# Patient Record
Sex: FEMALE | Race: WHITE | NOT HISPANIC OR LATINO | Employment: UNEMPLOYED | URBAN - METROPOLITAN AREA
[De-identification: names, ages, dates, MRNs, and addresses within clinical notes are randomized per-mention and may not be internally consistent; named-entity substitution may affect disease eponyms.]

---

## 2020-11-17 ENCOUNTER — APPOINTMENT (OUTPATIENT)
Dept: GENERAL RADIOLOGY | Facility: HOSPITAL | Age: 50
End: 2020-11-17

## 2020-11-17 ENCOUNTER — APPOINTMENT (OUTPATIENT)
Dept: CT IMAGING | Facility: HOSPITAL | Age: 50
End: 2020-11-17

## 2020-11-17 ENCOUNTER — HOSPITAL ENCOUNTER (EMERGENCY)
Facility: HOSPITAL | Age: 50
Discharge: HOME | End: 2020-11-17
Attending: EMERGENCY MEDICINE | Admitting: EMERGENCY MEDICINE

## 2020-11-17 VITALS
SYSTOLIC BLOOD PRESSURE: 152 MMHG | TEMPERATURE: 98.5 F | DIASTOLIC BLOOD PRESSURE: 92 MMHG | RESPIRATION RATE: 16 BRPM | OXYGEN SATURATION: 97 % | HEIGHT: 64 IN | WEIGHT: 185 LBS | HEART RATE: 96 BPM | BODY MASS INDEX: 31.58 KG/M2

## 2020-11-17 DIAGNOSIS — F10.920 ALCOHOLIC INTOXICATION WITHOUT COMPLICATION (HCC): ICD-10-CM

## 2020-11-17 DIAGNOSIS — F10.20 ALCOHOLISM (HCC): ICD-10-CM

## 2020-11-17 DIAGNOSIS — R10.9 ABDOMINAL PAIN, UNSPECIFIED ABDOMINAL LOCATION: Primary | ICD-10-CM

## 2020-11-17 DIAGNOSIS — R11.2 NON-INTRACTABLE VOMITING WITH NAUSEA, UNSPECIFIED VOMITING TYPE: ICD-10-CM

## 2020-11-17 LAB
ALBUMIN SERPL-MCNC: 4.3 G/DL (ref 3.5–5.2)
ALBUMIN/GLOB SERPL: 1.5 G/DL
ALP SERPL-CCNC: 86 U/L (ref 39–117)
ALT SERPL W P-5'-P-CCNC: 61 U/L (ref 1–33)
AMPHET+METHAMPHET UR QL: NEGATIVE
ANION GAP SERPL CALCULATED.3IONS-SCNC: 14.2 MMOL/L (ref 5–15)
AST SERPL-CCNC: 210 U/L (ref 1–32)
BACTERIA UR QL AUTO: ABNORMAL /HPF
BARBITURATES UR QL SCN: NEGATIVE
BASOPHILS # BLD AUTO: 0.04 10*3/MM3 (ref 0–0.2)
BASOPHILS NFR BLD AUTO: 1.3 % (ref 0–1.5)
BENZODIAZ UR QL SCN: NEGATIVE
BILIRUB SERPL-MCNC: 0.9 MG/DL (ref 0–1.2)
BILIRUB UR QL STRIP: NEGATIVE
BUN SERPL-MCNC: 7 MG/DL (ref 6–20)
BUN/CREAT SERPL: 12.5 (ref 7–25)
CALCIUM SPEC-SCNC: 9 MG/DL (ref 8.6–10.5)
CANNABINOIDS SERPL QL: NEGATIVE
CHLORIDE SERPL-SCNC: 101 MMOL/L (ref 98–107)
CLARITY UR: CLEAR
CO2 SERPL-SCNC: 23.8 MMOL/L (ref 22–29)
COCAINE UR QL: NEGATIVE
COLOR UR: YELLOW
CREAT SERPL-MCNC: 0.56 MG/DL (ref 0.57–1)
DEPRECATED RDW RBC AUTO: 51.2 FL (ref 37–54)
EOSINOPHIL # BLD AUTO: 0.14 10*3/MM3 (ref 0–0.4)
EOSINOPHIL NFR BLD AUTO: 4.6 % (ref 0.3–6.2)
ERYTHROCYTE [DISTWIDTH] IN BLOOD BY AUTOMATED COUNT: 14.2 % (ref 12.3–15.4)
ETHANOL BLD-MCNC: 275 MG/DL (ref 0–10)
ETHANOL UR QL: 0.28 %
GFR SERPL CREATININE-BSD FRML MDRD: 115 ML/MIN/1.73
GLOBULIN UR ELPH-MCNC: 2.8 GM/DL
GLUCOSE SERPL-MCNC: 97 MG/DL (ref 65–99)
GLUCOSE UR STRIP-MCNC: NEGATIVE MG/DL
HCT VFR BLD AUTO: 40.5 % (ref 34–46.6)
HGB BLD-MCNC: 14.1 G/DL (ref 12–15.9)
HGB UR QL STRIP.AUTO: ABNORMAL
HOLD SPECIMEN: NORMAL
HOLD SPECIMEN: NORMAL
HYALINE CASTS UR QL AUTO: ABNORMAL /LPF
IMM GRANULOCYTES # BLD AUTO: 0 10*3/MM3 (ref 0–0.05)
IMM GRANULOCYTES NFR BLD AUTO: 0 % (ref 0–0.5)
INR PPP: 0.97 (ref 0.9–1.1)
KETONES UR QL STRIP: NEGATIVE
LEUKOCYTE ESTERASE UR QL STRIP.AUTO: NEGATIVE
LYMPHOCYTES # BLD AUTO: 1.53 10*3/MM3 (ref 0.7–3.1)
LYMPHOCYTES NFR BLD AUTO: 49.8 % (ref 19.6–45.3)
MAGNESIUM SERPL-MCNC: 1.9 MG/DL (ref 1.6–2.6)
MCH RBC QN AUTO: 34.4 PG (ref 26.6–33)
MCHC RBC AUTO-ENTMCNC: 34.8 G/DL (ref 31.5–35.7)
MCV RBC AUTO: 98.8 FL (ref 79–97)
METHADONE UR QL SCN: NEGATIVE
MONOCYTES # BLD AUTO: 0.3 10*3/MM3 (ref 0.1–0.9)
MONOCYTES NFR BLD AUTO: 9.8 % (ref 5–12)
NEUTROPHILS NFR BLD AUTO: 1.06 10*3/MM3 (ref 1.7–7)
NEUTROPHILS NFR BLD AUTO: 34.5 % (ref 42.7–76)
NITRITE UR QL STRIP: NEGATIVE
NRBC BLD AUTO-RTO: 0 /100 WBC (ref 0–0.2)
OPIATES UR QL: NEGATIVE
OXYCODONE UR QL SCN: POSITIVE
PH UR STRIP.AUTO: 6.5 [PH] (ref 5–8)
PLATELET # BLD AUTO: 174 10*3/MM3 (ref 140–450)
PMV BLD AUTO: 10 FL (ref 6–12)
POTASSIUM SERPL-SCNC: 3.6 MMOL/L (ref 3.5–5.2)
PROT SERPL-MCNC: 7.1 G/DL (ref 6–8.5)
PROT UR QL STRIP: NEGATIVE
PROTHROMBIN TIME: 12.6 SECONDS (ref 11.7–14.2)
RBC # BLD AUTO: 4.1 10*6/MM3 (ref 3.77–5.28)
RBC # UR: ABNORMAL /HPF
REF LAB TEST METHOD: ABNORMAL
SODIUM SERPL-SCNC: 139 MMOL/L (ref 136–145)
SP GR UR STRIP: >=1.03 (ref 1–1.03)
SQUAMOUS #/AREA URNS HPF: ABNORMAL /HPF
UROBILINOGEN UR QL STRIP: ABNORMAL
WBC # BLD AUTO: 3.07 10*3/MM3 (ref 3.4–10.8)
WBC UR QL AUTO: ABNORMAL /HPF
WHOLE BLOOD HOLD SPECIMEN: NORMAL
WHOLE BLOOD HOLD SPECIMEN: NORMAL

## 2020-11-17 PROCEDURE — 25010000002 IOPAMIDOL 61 % SOLUTION: Performed by: EMERGENCY MEDICINE

## 2020-11-17 PROCEDURE — 85025 COMPLETE CBC W/AUTO DIFF WBC: CPT | Performed by: EMERGENCY MEDICINE

## 2020-11-17 PROCEDURE — 99284 EMERGENCY DEPT VISIT MOD MDM: CPT

## 2020-11-17 PROCEDURE — 99285 EMERGENCY DEPT VISIT HI MDM: CPT

## 2020-11-17 PROCEDURE — 96365 THER/PROPH/DIAG IV INF INIT: CPT

## 2020-11-17 PROCEDURE — 80307 DRUG TEST PRSMV CHEM ANLYZR: CPT | Performed by: EMERGENCY MEDICINE

## 2020-11-17 PROCEDURE — 80053 COMPREHEN METABOLIC PANEL: CPT | Performed by: EMERGENCY MEDICINE

## 2020-11-17 PROCEDURE — 81001 URINALYSIS AUTO W/SCOPE: CPT | Performed by: EMERGENCY MEDICINE

## 2020-11-17 PROCEDURE — 85610 PROTHROMBIN TIME: CPT | Performed by: EMERGENCY MEDICINE

## 2020-11-17 PROCEDURE — 25010000002 MAGNESIUM SULFATE PER 500 MG OF MAGNESIUM: Performed by: EMERGENCY MEDICINE

## 2020-11-17 PROCEDURE — 74177 CT ABD & PELVIS W/CONTRAST: CPT

## 2020-11-17 PROCEDURE — 25010000002 THIAMINE PER 100 MG: Performed by: EMERGENCY MEDICINE

## 2020-11-17 PROCEDURE — 71045 X-RAY EXAM CHEST 1 VIEW: CPT

## 2020-11-17 PROCEDURE — 83735 ASSAY OF MAGNESIUM: CPT | Performed by: EMERGENCY MEDICINE

## 2020-11-17 RX ORDER — SODIUM CHLORIDE 0.9 % (FLUSH) 0.9 %
10 SYRINGE (ML) INJECTION AS NEEDED
Status: DISCONTINUED | OUTPATIENT
Start: 2020-11-17 | End: 2020-11-17 | Stop reason: HOSPADM

## 2020-11-17 RX ORDER — VENLAFAXINE HYDROCHLORIDE 150 MG/1
150 CAPSULE, EXTENDED RELEASE ORAL DAILY
COMMUNITY

## 2020-11-17 RX ORDER — OXYCODONE HYDROCHLORIDE AND ACETAMINOPHEN 5; 325 MG/1; MG/1
1 TABLET ORAL EVERY 6 HOURS PRN
COMMUNITY
End: 2022-01-28

## 2020-11-17 RX ORDER — PROMETHAZINE HYDROCHLORIDE 25 MG/1
25 TABLET ORAL EVERY 6 HOURS PRN
COMMUNITY

## 2020-11-17 RX ADMIN — MAGNESIUM SULFATE HEPTAHYDRATE 1000 ML/HR: 500 INJECTION, SOLUTION INTRAMUSCULAR; INTRAVENOUS at 14:10

## 2020-11-17 RX ADMIN — SODIUM CHLORIDE, PRESERVATIVE FREE 10 ML: 5 INJECTION INTRAVENOUS at 12:40

## 2020-11-17 RX ADMIN — IOPAMIDOL 85 ML: 612 INJECTION, SOLUTION INTRAVENOUS at 14:59

## 2020-11-17 NOTE — ED NOTES
Patient was wearing facemask when RN entered the room and throughout our encounter. RN wearing PPE throughout all patient encounter including a face mask, goggles and gloves.      Diane Elaine RN  11/17/20 1527

## 2020-11-17 NOTE — DISCHARGE INSTRUCTIONS
As we discussed, make sure you follow-up with the treatment centers for further assistance to help stop your addiction to alcoholism.  [facility] is always an option.  Return if any concerns.

## 2020-11-17 NOTE — ED NOTES
"Pt to ED from home with c/o generalized weakness for 1 week, states she has hx of chronic etoh use. Drinks 600ml analilia calderón daily, states has been binge drinking x months. Went through detox at [facility] in January. States having flank pain, dark urine, states feeling very fatigued, also reports loose stools and nausea present, pt does states she has had chronic nausea since neck surgery. On phenergan at home but states doesn't help much, \"spitting up at home with no actually emesis, + upper back tightness x 1 week but denies cough . Pt states she is ambulatory at home. Denies fevers, cough. Denies CP     States feels too dehydrated to give UA but did urinate at home this am. Denies hx of depression. + hx of anxiety.     Patient was placed in face mask in first look. Patient was wearing facemask when I entered the room and throughout our encounter. I wore full protective equipment throughout this patient encounter including a face mask, eye shield, and gloves. Hand hygiene was performed before donning protective equipment and after removal when leaving the room.       Cheri Munoz, RN  11/17/20 2742       Cheri Munoz, RN  11/17/20 7545    "

## 2020-11-17 NOTE — ED PROVIDER NOTES
EMERGENCY DEPARTMENT ENCOUNTER    Room Number:  18/18  Date of encounter:  11/17/2020  PCP: Cierra Sheikh APRN  Historian: Patient      HPI:  Chief Complaint: Abdominal pain and flank pain with vomiting and alcoholism and wants help to stop drinking.  A complete HPI/ROS/PMH/PSH/SH/FH are unobtainable due to: Not applicable  Context: Radha Cao is a 50 y.o. female who presents to the ED c/o alcohol problem and wants to stop drinking.  For the past several months she has had increased alcohol consumption.  She is drinking approximately 1-1/2 pints of hard alcohol a day.  Her last drink was this morning.  She states that she needs help to stop drinking.  She denies any suicidal or homicidal ideation.  She is developed vomiting the past 1 to 2 weeks.  She will vomit a couple times a day.  She has decreased p.o. intake.  She says the vomit is usually yellow and bile.  She reports no bloody emesis.  She might have some loose stool at time but no definitive diarrhea.  Complains of some pain in the midepigastric area and periumbilical area and also in her flanks.  She believes that she is dehydrated.  She reports no burning with urination or frequent urination.  She reports no coughs or colds.  She reports no suicidal ideation.  She reports no illegal drug use.        Previous Episodes: Yes  Current Symptoms: See above    MEDICAL HISTORY REVIEWED        PAST MEDICAL HISTORY  Active Ambulatory Problems     Diagnosis Date Noted   • No Active Ambulatory Problems     Resolved Ambulatory Problems     Diagnosis Date Noted   • No Resolved Ambulatory Problems     Past Medical History:   Diagnosis Date   • H/O ETOH abuse          PAST SURGICAL HISTORY  Past Surgical History:   Procedure Laterality Date   • HYSTERECTOMY           FAMILY HISTORY  History reviewed. No pertinent family history.      SOCIAL HISTORY  Social History     Socioeconomic History   • Marital status:      Spouse name: Not on file   • Number of  children: Not on file   • Years of education: Not on file   • Highest education level: Not on file   Tobacco Use   • Smoking status: Never Smoker   Substance and Sexual Activity   • Alcohol use: Yes   • Drug use: Defer         ALLERGIES  Patient has no known allergies.        REVIEW OF SYSTEMS  Review of Systems     All systems reviewed and negative except for those discussed in HPI.       PHYSICAL EXAM    I have reviewed the triage vital signs and nursing notes.    ED Triage Vitals [11/17/20 1212]   Temp Heart Rate Resp BP SpO2   98.5 °F (36.9 °C) 78 16 (!) 182/76 97 %      Temp src Heart Rate Source Patient Position BP Location FiO2 (%)   Tympanic Monitor -- -- --       GENERAL: Female no acute distress.Vital signs on my initial evaluation unremarkable  HENT: nares patent  Head/neck/ face are symmetric without gross deformity, signs of trauma, or swelling  EYES: no scleral icterus, no conjunctival pallor.  NECK: Supple, no meningismus  CV: regular rhythm, regular rate with intact distal pulses.  No murmur rub  RESPIRATORY: normal effort and no respiratory distress.  Clear to auscultation bilaterally  ABDOMEN: soft and morbidly obese.  Has some periumbilical and midepigastric discomfort with palpation.  Also has some palpation flanks bilaterally.  There is no guarding or rebound.  Normal inspection.  MUSCULOSKELETAL: no deformity.  Intact distal pulses to upper and lower extremities.  They are equal and symmetric.  No cyanosis or pallor.  NEURO: alert and appropriate, moves all extremities, follows commands.  Alert and oriented x3.  No focal motor or sensory changes.  No nystagmus or visual change.  SKIN: warm, dry    Vital signs and nursing notes reviewed.        LAB RESULTS  Recent Results (from the past 24 hour(s))   Comprehensive Metabolic Panel    Collection Time: 11/17/20 12:40 PM    Specimen: Blood   Result Value Ref Range    Glucose 97 65 - 99 mg/dL    BUN 7 6 - 20 mg/dL    Creatinine 0.56 (L) 0.57 - 1.00  mg/dL    Sodium 139 136 - 145 mmol/L    Potassium 3.6 3.5 - 5.2 mmol/L    Chloride 101 98 - 107 mmol/L    CO2 23.8 22.0 - 29.0 mmol/L    Calcium 9.0 8.6 - 10.5 mg/dL    Total Protein 7.1 6.0 - 8.5 g/dL    Albumin 4.30 3.50 - 5.20 g/dL    ALT (SGPT) 61 (H) 1 - 33 U/L    AST (SGOT) 210 (H) 1 - 32 U/L    Alkaline Phosphatase 86 39 - 117 U/L    Total Bilirubin 0.9 0.0 - 1.2 mg/dL    eGFR Non African Amer 115 >60 mL/min/1.73    Globulin 2.8 gm/dL    A/G Ratio 1.5 g/dL    BUN/Creatinine Ratio 12.5 7.0 - 25.0    Anion Gap 14.2 5.0 - 15.0 mmol/L   Magnesium    Collection Time: 11/17/20 12:40 PM    Specimen: Blood   Result Value Ref Range    Magnesium 1.9 1.6 - 2.6 mg/dL   Ethanol    Collection Time: 11/17/20 12:40 PM    Specimen: Blood   Result Value Ref Range    Ethanol 275 (H) 0 - 10 mg/dL    Ethanol % 0.275 %   Protime-INR    Collection Time: 11/17/20 12:40 PM    Specimen: Blood   Result Value Ref Range    Protime 12.6 11.7 - 14.2 Seconds    INR 0.97 0.90 - 1.10   CBC Auto Differential    Collection Time: 11/17/20 12:40 PM    Specimen: Blood   Result Value Ref Range    WBC 3.07 (L) 3.40 - 10.80 10*3/mm3    RBC 4.10 3.77 - 5.28 10*6/mm3    Hemoglobin 14.1 12.0 - 15.9 g/dL    Hematocrit 40.5 34.0 - 46.6 %    MCV 98.8 (H) 79.0 - 97.0 fL    MCH 34.4 (H) 26.6 - 33.0 pg    MCHC 34.8 31.5 - 35.7 g/dL    RDW 14.2 12.3 - 15.4 %    RDW-SD 51.2 37.0 - 54.0 fl    MPV 10.0 6.0 - 12.0 fL    Platelets 174 140 - 450 10*3/mm3    Neutrophil % 34.5 (L) 42.7 - 76.0 %    Lymphocyte % 49.8 (H) 19.6 - 45.3 %    Monocyte % 9.8 5.0 - 12.0 %    Eosinophil % 4.6 0.3 - 6.2 %    Basophil % 1.3 0.0 - 1.5 %    Immature Grans % 0.0 0.0 - 0.5 %    Neutrophils, Absolute 1.06 (L) 1.70 - 7.00 10*3/mm3    Lymphocytes, Absolute 1.53 0.70 - 3.10 10*3/mm3    Monocytes, Absolute 0.30 0.10 - 0.90 10*3/mm3    Eosinophils, Absolute 0.14 0.00 - 0.40 10*3/mm3    Basophils, Absolute 0.04 0.00 - 0.20 10*3/mm3    Immature Grans, Absolute 0.00 0.00 - 0.05 10*3/mm3     nRBC 0.0 0.0 - 0.2 /100 WBC   Light Blue Top    Collection Time: 11/17/20 12:40 PM   Result Value Ref Range    Extra Tube hold for add-on    Green Top (Gel)    Collection Time: 11/17/20 12:40 PM   Result Value Ref Range    Extra Tube Hold for add-ons.    Lavender Top    Collection Time: 11/17/20 12:40 PM   Result Value Ref Range    Extra Tube hold for add-on    Gold Top - SST    Collection Time: 11/17/20 12:40 PM   Result Value Ref Range    Extra Tube Hold for add-ons.    Urinalysis With Microscopic If Indicated (No Culture) - Urine, Clean Catch    Collection Time: 11/17/20  3:39 PM    Specimen: Urine, Clean Catch   Result Value Ref Range    Color, UA Yellow Yellow, Straw    Appearance, UA Clear Clear    pH, UA 6.5 5.0 - 8.0    Specific Gravity, UA >=1.030 1.005 - 1.030    Glucose, UA Negative Negative    Ketones, UA Negative Negative    Bilirubin, UA Negative Negative    Blood, UA Trace (A) Negative    Protein, UA Negative Negative    Leuk Esterase, UA Negative Negative    Nitrite, UA Negative Negative    Urobilinogen, UA 1.0 E.U./dL 0.2 - 1.0 E.U./dL   Urinalysis, Microscopic Only - Urine, Clean Catch    Collection Time: 11/17/20  3:39 PM    Specimen: Urine, Clean Catch   Result Value Ref Range    RBC, UA 0-2 None Seen, 0-2 /HPF    WBC, UA 0-2 None Seen, 0-2 /HPF    Bacteria, UA 1+ (A) None Seen /HPF    Squamous Epithelial Cells, UA 0-2 None Seen, 0-2 /HPF    Hyaline Casts, UA 0-2 None Seen /LPF    Methodology Manual Light Microscopy        Ordered the above labs and independently reviewed the results.        RADIOLOGY  Ct Abdomen Pelvis With Contrast    Result Date: 11/17/2020  CT ABDOMEN AND PELVIS WITH CONTRAST  HISTORY: Abdominal pain, flank pain. Vomiting.  TECHNIQUE: Axial CT images of the abdomen and pelvis were obtained following administration of intravenous contrast. The patient was not given oral contrast Coronal and sagittal reformats were obtained.  COMPARISON: None  FINDINGS: There is diffuse  low-attenuation of the liver consistent with steatosis. Focal low density seen within the central left hepatic lobe on images 30 through 41 is favored to represent marked focal steatosis. No intrahepatic biliary dilatation is seen. The gallbladder is distended. The spleen is normal in size and attenuation. The pancreas is normal without ductal dilatation. Bilateral adrenal glands are normal. Both kidneys are normal in size and attenuation. No renal calculi or hydronephrosis. The urinary bladder is partially distended with mild wall thickening. The uterus is not identified and is likely surgically absent. The small and large bowel loops demonstrate normal caliber. There is no pathological retroperitoneal lymphadenopathy.      1. Diffuse hepatic steatosis. In addition there is focal hypoattenuating central left hepatic lobe abnormality favored to represent focal marked steatotic change. 2. Mild wall thickening of the urinary bladder likely related to underdistention, however, correlation with urine exam is recommended to rule out cystitis.  These findings were discussed with Dr. Dacosta by telephone.  Radiation dose reduction techniques were utilized, including automated exposure control and exposure modulation based on body size.       Xr Chest 1 View    Result Date: 11/17/2020  XR CHEST 1 VW-  HISTORY: Female who is 50 years-old,  cough  TECHNIQUE: Frontal view of the chest  COMPARISON: None available  FINDINGS: Heart, mediastinum and pulmonary vasculature are unremarkable. No focal pulmonary consolidation, pleural effusion, or pneumothorax. No acute osseous process.      No evidence for acute pulmonary process. Follow-up as clinical indications persist.  This report was finalized on 11/17/2020 1:51 PM by Dr. Ed Dixon M.D.        I ordered the above noted radiological studies. Reviewed by me and discussed with radiologist.  See dictation for official radiology  interpretation.      PROCEDURES    Procedures      MEDICATIONS GIVEN IN ER    Medications   sodium chloride 0.9 % flush 10 mL (10 mL Intravenous Given 11/17/20 1240)   multiple vitamin (M.V.I. Adult) 10 mL, thiamine (B-1) 100 mg, folic acid 1 mg, magnesium sulfate 2 g in sodium chloride 0.9 % 1,000 mL infusion (0 mL/hr Intravenous Stopped 11/17/20 1538)   iopamidol (ISOVUE-300) 61 % injection 100 mL (85 mL Intravenous Given by Other 11/17/20 1459)         PROGRESS, DATA ANALYSIS, CONSULTS, AND MEDICAL DECISION MAKING    Informed patient of the test that we will order.  Patient really feels that she is dehydrated she says that she is not drinking much.  She is able to consume alcohol.  She is also has not vomiting.  She has not eaten much but she did have some soup yesterday.  All questions answered at this time.  We are currently under a pandemic from the COVID19 infection.  The patient presented to the emergency department by ambulance or personal vehicle. I followed the current protocols required by Infection Control at  in my evaluation and treatment of the patient. The patient was wearing a face mask during my evaluation and throughout my encounter. During my whole encounter with this patient I used appropriate personal protective equipment.  This equipment consisted of eye protection, facemask, gown, and gloves.  I applied this equipment before entering the room.      All labs have been independently reviewed by me.  All radiology studies have been reviewed by me and discussed with radiologist dictating the report.   EKG's independently viewed and interpreted by me.  Discussion below represents my analysis of pertinent findings related to patient's condition, differential diagnosis, treatment plan and final disposition.      ED Course as of Nov 17 1629   Tue Nov 17, 2020   1328 Ethanol(!): 275 [MM]   1419 ALT (SGPT)(!): 61 [MM]   1419 Patient is an alcoholic.  Patient has mild elevation  liver function test very likely consistent with alcoholic hepatitis.   AST (SGOT)(!): 210 [MM]   1442 I reevaluated the patient.  She is very stable.  No distress.  Informed her the results of all of her lab work.  I also spoke with her spouse as well informed both of them Deep BATEMAN has some alcoholic hepatitis.  We can do a CT scan of her abdomen pelvis.  She still has not been able to give us a urine.  We actually have the banana bag fluids going at this time.  All questions answered at this time.    [MM]   1532 Discussed with Dr. Hardy the radiologist concerning the CT scan of the abdomen pelvis.  Patient has some hepatic steatosis.  Otherwise no acute process.    [MM]   1625 I have reevaluated the patient again.  Patient is hemodynamically stable in no distress.  Has not had any vomiting during her time in the emergency department.  I instructed her that she needs to stop drinking alcohol.  She has plans to stop that.  She is gone through rehab before and she has contacts that she is going to try to get in touch with for alcohol treatment.  She is not suicidal or homicidal she does not want evaluation by access her therapy here.  I informed her the results of the lab work and the CT scan.  All questions answered.  Patient spouse was in the room present during this conversation.    [MM]      ED Course User Index  [MM] Franc Dacosta MD       AS OF 16:29 EST VITALS:    BP - 152/92  HR - 96  TEMP - 98.5 °F (36.9 °C) (Tympanic)  02 SATS - 97%        DIAGNOSIS  Final diagnoses:   Abdominal pain, unspecified abdominal location   Non-intractable vomiting with nausea, unspecified vomiting type   Alcoholic intoxication without complication (CMS/HCC)   Alcoholism (CMS/HCC)         DISPOSITION  DISCHARGE    Patient discharged in stable condition.    Reviewed implications of results, diagnosis, meds, responsibility to follow up, warning signs and symptoms of possible worsening, potential complications and reasons to  return to ER, including worsening of symptoms, not tolerating liquids, any thoughts of harming herself, worsening of depression.  Not tolerate liquids, fever, chest pain, shortness of breath, any concerns..    Patient/Family voiced understanding of above instructions.    Discussed plan for discharge, as there is no emergent indication for admission. Pt/family is agreeable and understands need for follow up and repeat testing.  Pt is aware that discharge does not mean that nothing is wrong but it indicates no emergency is present that requires admission and they must continue care with follow-up as given below or physician of their choice.     FOLLOW-UP  Cierra Sheikh, APRN  [address]  [facility] [address] [phone]    In 3 days  Return if not tolerating any liquids, worsening of symptoms, fever, vomiting up blood, any blood in stool, chest pain, any concerns.         Medication List      No changes were made to your prescriptions during this visit.                  Franc Dacosta MD  11/17/20 6341

## 2020-11-17 NOTE — ED TRIAGE NOTES
Pt has been drinking x 1 week and reports she is weak.  She wants help c withdrawal  She drinks 650 ml of alcohol per day    Patient was placed in face mask during first look triage.  Patient was wearing a face mask throughout encounter.  I wore personal protective equipment throughout the encounter.  Hand hygiene was performed before and after patient encounter.

## 2020-11-17 NOTE — ED NOTES
" now at bedside, states they are very frustrated because she was getting Rx's for ativan and phenergan from PCP and was doing well x months and was sober, PCP will no longer prescribe those meds for her.  states \"she can't not drink\", does have a liver MD, has not seen since prior to neck surgery early this yr. Not currently seeing psychiatrist. States cannot seem to get any help d/t covid. Pt also has been taking her daughter's phenergan.      Cheri Munoz, RN  11/17/20 7610    "

## 2022-01-28 ENCOUNTER — OFFICE VISIT (OUTPATIENT)
Dept: FAMILY MEDICINE CLINIC | Facility: CLINIC | Age: 52
End: 2022-01-28

## 2022-01-28 VITALS
TEMPERATURE: 97.5 F | DIASTOLIC BLOOD PRESSURE: 62 MMHG | HEIGHT: 64 IN | OXYGEN SATURATION: 98 % | RESPIRATION RATE: 16 BRPM | HEART RATE: 69 BPM | WEIGHT: 213 LBS | SYSTOLIC BLOOD PRESSURE: 110 MMHG | BODY MASS INDEX: 36.37 KG/M2

## 2022-01-28 DIAGNOSIS — H65.02 NON-RECURRENT ACUTE SEROUS OTITIS MEDIA OF LEFT EAR: ICD-10-CM

## 2022-01-28 DIAGNOSIS — K74.60 HEPATIC CIRRHOSIS, UNSPECIFIED HEPATIC CIRRHOSIS TYPE, UNSPECIFIED WHETHER ASCITES PRESENT: ICD-10-CM

## 2022-01-28 DIAGNOSIS — M50.30 DDD (DEGENERATIVE DISC DISEASE), CERVICAL: ICD-10-CM

## 2022-01-28 DIAGNOSIS — R10.11 RIGHT UPPER QUADRANT PAIN: Primary | ICD-10-CM

## 2022-01-28 DIAGNOSIS — Z98.890: ICD-10-CM

## 2022-01-28 DIAGNOSIS — F10.10 ETOH ABUSE: ICD-10-CM

## 2022-01-28 DIAGNOSIS — R93.5 ABNORMAL US (ULTRASOUND) OF ABDOMEN: ICD-10-CM

## 2022-01-28 DIAGNOSIS — Z94.4: ICD-10-CM

## 2022-01-28 PROBLEM — R50.9 FEVER: Status: RESOLVED | Noted: 2020-12-28 | Resolved: 2022-01-28

## 2022-01-28 PROBLEM — R74.01 ELEVATED AST (SGOT): Status: ACTIVE | Noted: 2018-01-15

## 2022-01-28 PROBLEM — E66.9 OBESITY: Status: ACTIVE | Noted: 2022-01-28

## 2022-01-28 PROBLEM — U07.1 PNEUMONIA DUE TO COVID-19 VIRUS: Status: RESOLVED | Noted: 2020-12-28 | Resolved: 2022-01-28

## 2022-01-28 PROBLEM — M48.00 SPINAL STENOSIS: Status: ACTIVE | Noted: 2020-04-29

## 2022-01-28 PROBLEM — R22.1 NECK SWELLING: Status: ACTIVE | Noted: 2020-05-03

## 2022-01-28 PROBLEM — J12.82 PNEUMONIA DUE TO COVID-19 VIRUS: Status: ACTIVE | Noted: 2020-12-28

## 2022-01-28 PROBLEM — J12.82 PNEUMONIA DUE TO COVID-19 VIRUS: Status: RESOLVED | Noted: 2020-12-28 | Resolved: 2022-01-28

## 2022-01-28 PROBLEM — G62.9 NEUROPATHY: Status: ACTIVE | Noted: 2022-01-28

## 2022-01-28 PROBLEM — E51.9 THIAMINE DEFICIENCY, UNSPECIFIED: Status: ACTIVE | Noted: 2020-01-21

## 2022-01-28 PROBLEM — M54.6 THORACIC BACK PAIN: Status: ACTIVE | Noted: 2018-09-21

## 2022-01-28 PROBLEM — U07.1 PNEUMONIA DUE TO COVID-19 VIRUS: Status: ACTIVE | Noted: 2020-12-28

## 2022-01-28 PROBLEM — F10.20 ALCOHOL USE DISORDER, SEVERE, DEPENDENCE: Status: ACTIVE | Noted: 2019-12-16

## 2022-01-28 PROBLEM — R06.83 SNORING: Status: ACTIVE | Noted: 2022-01-28

## 2022-01-28 PROBLEM — F10.939 ALCOHOL WITHDRAWAL SYNDROME: Status: ACTIVE | Noted: 2019-02-04

## 2022-01-28 PROBLEM — R50.9 FEVER: Status: ACTIVE | Noted: 2020-12-28

## 2022-01-28 PROCEDURE — 99204 OFFICE O/P NEW MOD 45 MIN: CPT | Performed by: NURSE PRACTITIONER

## 2022-01-28 RX ORDER — BROMPHENIRAMINE MALEATE, PSEUDOEPHEDRINE HYDROCHLORIDE, AND DEXTROMETHORPHAN HYDROBROMIDE 2; 30; 10 MG/5ML; MG/5ML; MG/5ML
SYRUP ORAL
COMMUNITY
Start: 2021-11-15 | End: 2022-03-10

## 2022-01-28 RX ORDER — CYCLOBENZAPRINE HCL 10 MG
TABLET ORAL
COMMUNITY
End: 2022-06-16

## 2022-01-28 RX ORDER — AMOXICILLIN 875 MG/1
875 TABLET, COATED ORAL EVERY 12 HOURS SCHEDULED
Qty: 20 TABLET | Refills: 0 | Status: SHIPPED | OUTPATIENT
Start: 2022-01-28 | End: 2022-02-25

## 2022-01-28 RX ORDER — QUETIAPINE FUMARATE 25 MG/1
25 TABLET, FILM COATED ORAL
COMMUNITY
End: 2022-03-10

## 2022-01-28 RX ORDER — ONDANSETRON HYDROCHLORIDE 8 MG/1
8 TABLET, FILM COATED ORAL EVERY 8 HOURS PRN
COMMUNITY
End: 2022-01-28

## 2022-01-28 RX ORDER — ONDANSETRON 4 MG/1
8 TABLET, ORALLY DISINTEGRATING ORAL EVERY 8 HOURS PRN
Qty: 21 TABLET | Refills: 0 | Status: SHIPPED | OUTPATIENT
Start: 2022-01-28 | End: 2022-02-07

## 2022-01-28 RX ORDER — HYDROXYZINE PAMOATE 50 MG/1
CAPSULE ORAL
COMMUNITY
End: 2022-02-25 | Stop reason: ALTCHOICE

## 2022-01-28 RX ORDER — FLUCONAZOLE 150 MG/1
150 TABLET ORAL ONCE
Qty: 1 TABLET | Refills: 2 | Status: SHIPPED | OUTPATIENT
Start: 2022-01-28 | End: 2022-01-28

## 2022-01-28 RX ORDER — IBUPROFEN 800 MG/1
TABLET ORAL
COMMUNITY
End: 2022-03-30

## 2022-01-28 NOTE — PATIENT INSTRUCTIONS
Amoxicillin capsules or tablets  What is this medicine?  AMOXICILLIN (a mox i ANDREA in) is a penicillin antibiotic. It is used to treat certain kinds of bacterial infections. It will not work for colds, flu, or other viral infections.  This medicine may be used for other purposes; ask your health care provider or pharmacist if you have questions.  COMMON BRAND NAME(S): Amoxil, Moxilin, Sumox, Trimox  What should I tell my health care provider before I take this medicine?  They need to know if you have any of these conditions:  · kidney disease  · an unusual or allergic reaction to amoxicillin, other penicillins, cephalosporin antibiotics, other medicines, foods, dyes, or preservatives  · pregnant or trying to get pregnant  · breast-feeding  How should I use this medicine?  Take this medicine by mouth with a glass of water. Follow the directions on your prescription label. You can take it with or without food. If it upsets your stomach, take it with food. Take your medicine at regular intervals. Do not take your medicine more often than directed. Take all of your medicine as directed even if you think you are better. Do not skip doses or stop your medicine early.  Talk to your pediatrician regarding the use of this medicine in children. While this drug may be prescribed for selected conditions, precautions do apply.  Overdosage: If you think you have taken too much of this medicine contact a poison control center or emergency room at once.  NOTE: This medicine is only for you. Do not share this medicine with others.  What if I miss a dose?  If you miss a dose, take it as soon as you can. If it is almost time for your next dose, take only that dose. Do not take double or extra doses.  What may interact with this medicine?  · allopurinol  · birth control pills  · certain antibiotics like chloramphenicol, erythromycin, sulfamethoxazole, tetracycline  · certain medicines that treat or prevent blood clots like  warfarin  This list may not describe all possible interactions. Give your health care provider a list of all the medicines, herbs, non-prescription drugs, or dietary supplements you use. Also tell them if you smoke, drink alcohol, or use illegal drugs. Some items may interact with your medicine.  What should I watch for while using this medicine?  Tell your health care professional if your symptoms do not start to get better or if they get worse.  Do not treat diarrhea with over the counter products. Contact your health care professional if you have diarrhea that lasts more than 2 days or if it is severe and watery.  If you have diabetes, you may get a false-positive result for sugar in your urine. Check with your health care professional.  Birth control may not work properly while you are taking this medicine. Talk to your health care professional about using an extra method of birth control.  This medicine may cause serious skin reactions. They can happen weeks to months after starting the medicine. Contact your health care provider right away if you notice fevers or flu-like symptoms with a rash. The rash may be red or purple and then turn into blisters or peeling of the skin. Or, you might notice a red rash with swelling of the face, lips or lymph nodes in your neck or under your arms.  What side effects may I notice from receiving this medicine?  Side effects that you should report to your doctor or health care professional as soon as possible:  · allergic reactions like skin rash, itching or hives, swelling of the face, lips, or tongue  · bloody or watery diarrhea  · breathing problems  · feeling faint; lightheaded, falls  · fever  · redness, blistering, peeling or loosening of the skin, including inside the mouth  · seizures  · signs and symptoms of kidney injury like trouble passing urine or change in the amount of urine  · signs and symptoms of liver injury like dark yellow or brown urine; general ill  feeling or flu-like symptoms; light-colored stools; loss of appetite; nausea; right upper belly pain; unusually weak or tired; yellowing of the eyes or skin  · unusual bleeding or bruising  · unusually weak or tired  Side effects that usually do not require medical attention (report to your doctor or health care professional if they continue or are bothersome):  · anxious  · confusion  · diarrhea  · dizziness  · headache  · nausea, vomiting  · stomach upset  · trouble sleeping  This list may not describe all possible side effects. Call your doctor for medical advice about side effects. You may report side effects to FDA at 5-936-BMB-7025.  Where should I keep my medicine?  Keep out of the reach of children.  Store at room temperature between 20 and 25 degrees C (68 and 77 degrees F). Throw away any unused medicine after the expiration date.  NOTE: This sheet is a summary. It may not cover all possible information. If you have questions about this medicine, talk to your doctor, pharmacist, or health care provider.  © 2021 Elsevier/Gold Standard (2020-02-28 14:32:29)  Otitis Media, Adult    Otitis media occurs when there is inflammation and fluid in the middle ear space with signs and symptoms of an acute infection. The middle ear is a part of the ear that contains bones for hearing as well as air that helps send sounds to the brain. When infected fluid builds up in this space, it causes pressure and results in symptoms of acute otitis media. The eustachian tube connects the middle ear to the back of the nose (nasopharynx) and normally allows air into the middle ear space. If the eustachian tube becomes blocked, fluid can build up and become infected.  What are the causes?  This condition is caused by a blockage in the eustachian tube. This can be caused by an object like mucus, or by swelling (edema) of the tube. Problems that can cause a blockage include:  · A cold or other upper respiratory  infection.  · Allergies.  · An irritant, such as tobacco smoke.  · Enlarged adenoids. The adenoids are areas of soft tissue located high in the back of the throat, behind the nose and the roof of the mouth. They are part of the body's defense system (immune system).  · A mass in the nasopharynx.  · Damage to the ear caused by pressure changes (barotrauma).  What are the signs or symptoms?  Symptoms of this condition include:  · Ear pain.  · Fever.  · Decreased hearing.  · Tiredness (lethargy).  · Fluid leaking from the ear, if the eardrum is ruptured or has burst.  · Ringing in the ear.  How is this diagnosed?    This condition is diagnosed with a physical exam. During the exam, your health care provider will use an instrument called an otoscope to look in your ear and check for redness, swelling, and fluid. He or she will also ask about your symptoms.  Your health care provider may also order tests, such as:  · A pneumatic otoscopy. This is a test to check the movement of the eardrum. It is done by squeezing a small amount of air into the ear.  · A tympanogram is a test that shows how well the eardrum moves in response to air pressure in the ear canal. It provides a graph for your health care provider to review.  How is this treated?  This condition can go away on its own within 3-5 days. But if the condition is caused by a bacterial infection and does not go away on its own, or if it keeps coming back, your health care provider may:  · Prescribe antibiotic medicine to treat the infection.  · Prescribe or recommend medicines to control pain.  Follow these instructions at home:  · Take over-the-counter and prescription medicines only as told by your health care provider.  · If you were prescribed an antibiotic medicine, take it as told by your health care provider. Do not stop taking the antibiotic even if you start to feel better.  · Keep all follow-up visits as told by your health care provider. This is  important.  Contact a health care provider if:  · You have bleeding from your nose.  · There is a lump on your neck.  · You are not feeling better in 5 days.  · You feel worse instead of better.  Get help right away if:  · You have severe pain that is not controlled with medicine.  · You have swelling, redness, or pain around your ear.  · You have stiffness in your neck.  · A part of your face is not moving (paralyzed).  · The bone behind your ear (mastoid) is tender when you touch it.  · You develop a severe headache.  Summary  · Otitis media is redness, soreness, and swelling of the middle ear, usually resulting in pain.  · This condition can go away on its own within 3-5 days.  · If the problem does not go away in 3-5 days, your health care provider may prescribe or recommend medicines to treat the infection or your symptoms.  · If you were prescribed an antibiotic medicine, take it as told by your health care provider.  · Follow all instructions you were given by your health care provider.  This information is not intended to replace advice given to you by your health care provider. Make sure you discuss any questions you have with your health care provider.  Document Revised: 11/19/2020 Document Reviewed: 11/19/2020  Elsevier Patient Education © 2021 Elsevier Inc.

## 2022-01-28 NOTE — PROGRESS NOTES
Chief Complaint  Establish Care    Annabelle Garcia presents to [facility]      51-year-old female presents to the office today for establishment of care and to discuss some ongoing issues.  She has been having intermittent right upper quadrant pain on and off for about a month.  She states that the pain is worse when she eats fatty foods.  She does have some intermittent nausea.  She is unsure as it related to that or the medication that she has been taking for her back and neck.  She is followed by [facility] [place] for some ongoing back pain and neck pain that she has had for several years.  No vomiting no fever no acute abdominal pain today.  States it has been better this week.      She is a recovering alcoholic she did get out of rehab within the year.  States she has not drank since that time.    She is also complaining of some left ear pain that has been ongoing.  She denies any drainage    She is an active problem list of the following  Patient Active Problem List   Diagnosis   • Bloating   • Acute gastritis   • Alcohol use disorder, severe, dependence (HCC)   • Alcohol withdrawal syndrome (HCC)   • ETOH abuse   • Depression   • DDD (degenerative disc disease), cervical   • Dysphagia, unspecified   • Elevated AST (SGOT)   • Epigastric pain   • Heartburn   • History of fibromyalgia   • Impingement syndrome of left shoulder   • Irritable bowel syndrome with diarrhea   • Long term current use of therapeutic drug   • Nausea   • Neck swelling   • Neuropathy   • Obesity   • Tendinosis   • Pain in joint of left shoulder   • Shoulder pain   • Snoring   • Spinal stenosis   • Thiamine deficiency, unspecified   • Thoracic back pain   • Non-recurrent acute serous otitis media of left ear     She has been compliant with the following medications  Current Outpatient Medications on File Prior to Visit   Medication Sig Dispense Refill   •  brompheniramine-pseudoephedrine-DM 30-2-10 MG/5ML syrup Take 10 mL by mouth every 4 hours as needed for cough     • cyclobenzaprine (FLEXERIL) 10 MG tablet cyclobenzaprine 10 mg tablet     • hydrOXYzine pamoate (VISTARIL) 50 MG capsule hydroxyzine pamoate 50 mg capsule   TAKE 1 CAPSULE BY MOUTH THREE TIMES DAILY AS NEEDED FOR ANXIETY     • ibuprofen (ADVIL,MOTRIN) 800 MG tablet ibuprofen 800 mg tablet     • mupirocin (BACTROBAN) 2 % ointment mupirocin 2 % topical ointment     • promethazine (PHENERGAN) 25 MG tablet Take 25 mg by mouth Every 6 (Six) Hours As Needed for Nausea or Vomiting.     • QUEtiapine (SEROquel) 25 MG tablet Take 25 mg by mouth.     • venlafaxine XR (EFFEXOR-XR) 150 MG 24 hr capsule Take 150 mg by mouth Daily.     • [DISCONTINUED] ondansetron (ZOFRAN) 8 MG tablet Take 8 mg by mouth Every 8 (Eight) Hours As Needed.     • oxyCODONE-acetaminophen (PERCOCET) 5-325 MG per tablet Take 1 tablet by mouth Every 6 (Six) Hours As Needed.       No current facility-administered medications on file prior to visit.     She denies any medication side effects    The following portions of the patient's history were reviewed and updated as appropriate: allergies, current medications, past family history, past medical history, past social history, past surgical history, and problem list      Review of Systems   Constitutional: Negative for chills, fatigue and fever.   Eyes: Negative for visual disturbance.   Respiratory: Negative for cough and shortness of breath.    Cardiovascular: Negative for chest pain, palpitations and leg swelling.   Gastrointestinal: Positive for abdominal pain and nausea. Negative for anal bleeding, blood in stool, constipation, diarrhea, rectal pain and vomiting.        Intermittent right upper quadrant pain worse after eating fatty foods x1 month   Musculoskeletal: Positive for back pain and neck pain.   Neurological: Negative for dizziness and light-headedness.        Objective   Vital  "Signs:   Vitals:    01/28/22 1403   BP: 110/62   BP Location: Left arm   Patient Position: Sitting   Cuff Size: Adult   Pulse: 69   Resp: 16   Temp: 97.5 °F (36.4 °C)   SpO2: 98%   Weight: 96.6 kg (213 lb)   Height: 162.6 cm (64.02\")        Physical Exam  Constitutional:       General: She is not in acute distress.     Appearance: Normal appearance. She is normal weight. She is not ill-appearing.   HENT:      Head: Normocephalic.      Nose: Nose normal. No congestion.   Eyes:      Pupils: Pupils are equal, round, and reactive to light.   Cardiovascular:      Rate and Rhythm: Normal rate and regular rhythm.      Pulses: Normal pulses.      Heart sounds: Normal heart sounds. No murmur heard.      Pulmonary:      Effort: Pulmonary effort is normal.      Breath sounds: Normal breath sounds.   Abdominal:      General: Abdomen is flat. Bowel sounds are normal. There is no distension.      Palpations: Abdomen is soft. There is no mass.      Tenderness: There is no abdominal tenderness. There is no right CVA tenderness, left CVA tenderness, guarding or rebound. Negative signs include Vaughan's sign, McBurney's sign and psoas sign.      Hernia: No hernia is present.       Musculoskeletal:      Cervical back: Normal range of motion and neck supple.   Skin:     General: Skin is warm and dry.      Capillary Refill: Capillary refill takes less than 2 seconds.      Findings: No rash.   Neurological:      Mental Status: She is alert and oriented to person, place, and time.   Psychiatric:         Mood and Affect: Mood normal.         Behavior: Behavior normal.         Thought Content: Thought content normal.         Judgment: Judgment normal.          Result Review :     The following data was reviewed by: SUMMER Pratt on 01/28/2022:  VITAMIN D 25 HYDROXY (07/28/2021 11:11)  CBC AND DIFFERENTIAL (07/28/2021 11:11)  COMPREHENSIVE METABOLIC PANEL (07/28/2021 11:11)  T4, FREE (07/28/2021 11:11)  T3, FREE (07/28/2021 " 11:11)  TSH (07/28/2021 11:11)  LIPID PANEL (07/28/2021 11:11)  HEMOGLOBIN A1C (07/28/2021 11:11)  Low vitamin D           Assessment and Plan    Diagnoses and all orders for this visit:    1. Right upper quadrant pain (Primary)  -     US Gallbladder; Future  -     Comprehensive Metabolic Panel  -     CBC & Differential  -     Lipid Panel  -     TSH  -     T4, Free  -     T3, Free  -     Hemoglobin A1c  -     Amylase  -     Lipase  -     Vitamin D 25 Hydroxy  -     Vitamin B12  -     Folate    2. Non-recurrent acute serous otitis media of left ear  -     amoxicillin (AMOXIL) 875 MG tablet; Take 1 tablet by mouth Every 12 (Twelve) Hours. For infection  Dispense: 20 tablet; Refill: 0    3. ETOH abuse    4. DDD (degenerative disc disease), cervical    Other orders  -     fluconazole (Diflucan) 150 MG tablet; Take 1 tablet by mouth 1 (One) Time for 1 dose. One PO X 1 for yeast infection  Dispense: 1 tablet; Refill: 2  -     ondansetron ODT (Zofran ODT) 4 MG disintegrating tablet; Place 2 tablets on the tongue Every 8 (Eight) Hours As Needed for Nausea or Vomiting for up to 10 days.  Dispense: 21 tablet; Refill: 0      Plan  Labs ordered today  Ultrasound of gallbladder ordered for further evaluation if continued pain intermittently  Amoxicillin ordered for otitis media/ear pain take medication as prescribed  Diflucan patient states has vaginal yeast infections with antibiotics  Zofran ordered as needed for nausea  Keep follow-ups with [facility] for neck and back pain  Encourage pt to go to ER with acute abdominal pain or distension, or fever or vomiting  Pt declined at this time  States she does not have any of those symptoms at this time      Follow Up   No follow-ups on file.  Patient was given instructions and counseling regarding her condition or for health maintenance advice. Please see specific information pulled into the AVS if appropriate.

## 2022-02-02 ENCOUNTER — TELEPHONE (OUTPATIENT)
Dept: FAMILY MEDICINE CLINIC | Facility: CLINIC | Age: 52
End: 2022-02-02

## 2022-02-02 NOTE — TELEPHONE ENCOUNTER
PATIENT IS NEEDING TO RESCHEDULE HER LAB APPT    SHE DID NOT FAST       PLEASE ADVISE       Radha Coa ([facility]) [phone] (H)

## 2022-02-11 ENCOUNTER — HOSPITAL ENCOUNTER (OUTPATIENT)
Dept: ULTRASOUND IMAGING | Facility: HOSPITAL | Age: 52
Discharge: HOME | End: 2022-02-11
Admitting: NURSE PRACTITIONER

## 2022-02-11 ENCOUNTER — TELEPHONE (OUTPATIENT)
Dept: FAMILY MEDICINE CLINIC | Facility: CLINIC | Age: 52
End: 2022-02-11

## 2022-02-11 DIAGNOSIS — R10.11 RIGHT UPPER QUADRANT PAIN: ICD-10-CM

## 2022-02-11 PROCEDURE — 76705 ECHO EXAM OF ABDOMEN: CPT

## 2022-02-11 RX ORDER — FLUCONAZOLE 150 MG/1
150 TABLET ORAL ONCE
Qty: 1 TABLET | Refills: 0 | Status: SHIPPED | OUTPATIENT
Start: 2022-02-11 | End: 2022-02-11

## 2022-02-11 NOTE — TELEPHONE ENCOUNTER
Caller: Radha Cao    Relationship: Self    Best call back number: [phone]    What medication are you requesting: Fluconazole 150 mg Oral Once    If a prescription is needed, what is your preferred pharmacy and phone number: [facility] - [place], [place] - [facility] [address] - [phone]  - [phone] FX     Additional notes: PATIENT STATES SHE IS REQUESTING A REFILL WITH HIGHER DOSE. PATIENT STATES SHE IS OUT

## 2022-02-25 ENCOUNTER — OFFICE VISIT (OUTPATIENT)
Dept: GASTROENTEROLOGY | Facility: CLINIC | Age: 52
End: 2022-02-25

## 2022-02-25 ENCOUNTER — TELEPHONE (OUTPATIENT)
Dept: GASTROENTEROLOGY | Facility: CLINIC | Age: 52
End: 2022-02-25

## 2022-02-25 VITALS — BODY MASS INDEX: 39.61 KG/M2 | HEIGHT: 64 IN | WEIGHT: 232 LBS | TEMPERATURE: 97.2 F

## 2022-02-25 DIAGNOSIS — R10.13 DYSPEPSIA: ICD-10-CM

## 2022-02-25 DIAGNOSIS — R13.10 DYSPHAGIA, UNSPECIFIED TYPE: ICD-10-CM

## 2022-02-25 DIAGNOSIS — Z87.19 HISTORY OF ESOPHAGITIS: ICD-10-CM

## 2022-02-25 DIAGNOSIS — R93.2 ABNORMAL LIVER ULTRASOUND: ICD-10-CM

## 2022-02-25 DIAGNOSIS — R10.11 RIGHT UPPER QUADRANT ABDOMINAL PAIN: Primary | ICD-10-CM

## 2022-02-25 PROCEDURE — 99214 OFFICE O/P EST MOD 30 MIN: CPT | Performed by: NURSE PRACTITIONER

## 2022-02-25 RX ORDER — PANTOPRAZOLE SODIUM 40 MG/1
40 TABLET, DELAYED RELEASE ORAL DAILY
Qty: 90 TABLET | Refills: 3 | Status: ON HOLD | OUTPATIENT
Start: 2022-02-25 | End: 2022-04-01 | Stop reason: SDUPTHER

## 2022-02-25 RX ORDER — BUSPIRONE HYDROCHLORIDE 5 MG/1
5 TABLET ORAL
COMMUNITY
Start: 2022-02-22 | End: 2022-06-16
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
## 2022-02-25 NOTE — TELEPHONE ENCOUNTER
SHELBI Dumont for EGD on 04/01/2022  arrive at  930am . Gave Prep instructions in office.    Advised PT  that [facility] will call with final arrival time  24 hrs before procedure. If they do not get a phone call, arrival time will stay the same as given on instructions

## 2022-02-26 LAB
AFP-TM SERPL-MCNC: <0.9 NG/ML (ref 0–8.3)
ALBUMIN SERPL-MCNC: 4 G/DL (ref 3.8–4.9)
ALBUMIN/GLOB SERPL: 1.8 {RATIO} (ref 1.2–2.2)
ALP SERPL-CCNC: 56 IU/L (ref 44–121)
ALT SERPL-CCNC: 11 IU/L (ref 0–32)
AST SERPL-CCNC: 19 IU/L (ref 0–40)
BASOPHILS # BLD AUTO: 0 X10E3/UL (ref 0–0.2)
BASOPHILS NFR BLD AUTO: 1 %
BILIRUB SERPL-MCNC: 0.3 MG/DL (ref 0–1.2)
BUN SERPL-MCNC: 9 MG/DL (ref 6–24)
BUN/CREAT SERPL: 13 (ref 9–23)
CALCIUM SERPL-MCNC: 9.2 MG/DL (ref 8.7–10.2)
CHLORIDE SERPL-SCNC: 102 MMOL/L (ref 96–106)
CO2 SERPL-SCNC: 24 MMOL/L (ref 20–29)
CREAT SERPL-MCNC: 0.68 MG/DL (ref 0.57–1)
EOSINOPHIL # BLD AUTO: 0.3 X10E3/UL (ref 0–0.4)
EOSINOPHIL NFR BLD AUTO: 4 %
ERYTHROCYTE [DISTWIDTH] IN BLOOD BY AUTOMATED COUNT: 12.6 % (ref 11.7–15.4)
GLOBULIN SER CALC-MCNC: 2.2 G/DL (ref 1.5–4.5)
GLUCOSE SERPL-MCNC: 65 MG/DL (ref 65–99)
HCT VFR BLD AUTO: 37.3 % (ref 34–46.6)
HGB BLD-MCNC: 12.5 G/DL (ref 11.1–15.9)
IMM GRANULOCYTES # BLD AUTO: 0 X10E3/UL (ref 0–0.1)
IMM GRANULOCYTES NFR BLD AUTO: 0 %
INR PPP: NORMAL
LYMPHOCYTES # BLD AUTO: 2.4 X10E3/UL (ref 0.7–3.1)
LYMPHOCYTES NFR BLD AUTO: 38 %
MCH RBC QN AUTO: 31.8 PG (ref 26.6–33)
MCHC RBC AUTO-ENTMCNC: 33.5 G/DL (ref 31.5–35.7)
MCV RBC AUTO: 95 FL (ref 79–97)
MONOCYTES # BLD AUTO: 0.6 X10E3/UL (ref 0.1–0.9)
MONOCYTES NFR BLD AUTO: 10 %
NEUTROPHILS # BLD AUTO: 3.1 X10E3/UL (ref 1.4–7)
NEUTROPHILS NFR BLD AUTO: 47 %
PLATELET # BLD AUTO: 348 X10E3/UL (ref 150–450)
POTASSIUM SERPL-SCNC: 4.3 MMOL/L (ref 3.5–5.2)
PROT SERPL-MCNC: 6.2 G/DL (ref 6–8.5)
PROTHROMBIN TIME: NORMAL S
RBC # BLD AUTO: 3.93 X10E6/UL (ref 3.77–5.28)
SODIUM SERPL-SCNC: 141 MMOL/L (ref 134–144)
WBC # BLD AUTO: 6.4 X10E3/UL (ref 3.4–10.8)

## 2022-03-01 ENCOUNTER — TELEPHONE (OUTPATIENT)
Dept: GASTROENTEROLOGY | Facility: CLINIC | Age: 52
End: 2022-03-01

## 2022-03-01 DIAGNOSIS — R89.9 ABNORMAL LABORATORY TEST: Primary | ICD-10-CM

## 2022-03-01 NOTE — TELEPHONE ENCOUNTER
Patient called. Advised as per Steph's note. She verb understanding.   Lab appointment 03/8@1100.   Patient to call [facility] for her MRI/MRCP appointment.

## 2022-03-01 NOTE — TELEPHONE ENCOUNTER
----- Message from SUMMER Pederson sent at 3/1/2022 12:59 PM EST -----  Labs are normal however PT/INR could not be completed due to insufficient specimen.  If patient is amendable recommend redrawing.  Await MRI results.

## 2022-03-10 ENCOUNTER — TELEMEDICINE (OUTPATIENT)
Dept: FAMILY MEDICINE CLINIC | Facility: CLINIC | Age: 52
End: 2022-03-10

## 2022-03-10 DIAGNOSIS — M54.6 CHRONIC BILATERAL THORACIC BACK PAIN: ICD-10-CM

## 2022-03-10 DIAGNOSIS — M54.2 CERVICAL PAIN (NECK): Primary | ICD-10-CM

## 2022-03-10 DIAGNOSIS — G89.29 CHRONIC BILATERAL THORACIC BACK PAIN: ICD-10-CM

## 2022-03-10 DIAGNOSIS — M50.30 DDD (DEGENERATIVE DISC DISEASE), CERVICAL: ICD-10-CM

## 2022-03-10 PROCEDURE — 99214 OFFICE O/P EST MOD 30 MIN: CPT | Performed by: NURSE PRACTITIONER

## 2022-03-10 RX ORDER — METHYLPREDNISOLONE 4 MG/1
TABLET ORAL
Qty: 21 EACH | Refills: 0 | Status: SHIPPED | OUTPATIENT
Start: 2022-03-10 | End: 2022-03-30

## 2022-03-10 RX ORDER — QUETIAPINE FUMARATE 100 MG/1
TABLET, FILM COATED ORAL
COMMUNITY
Start: 2022-02-22

## 2022-03-10 RX ORDER — NALTREXONE HYDROCHLORIDE 50 MG/1
50 TABLET, FILM COATED ORAL
COMMUNITY
Start: 2022-02-22 | End: 2022-03-30

## 2022-03-10 RX ORDER — QUETIAPINE FUMARATE 50 MG/1
TABLET, FILM COATED ORAL
COMMUNITY
Start: 2022-02-14

## 2022-03-10 RX ORDER — VENLAFAXINE HYDROCHLORIDE 37.5 MG/1
37.5 CAPSULE, EXTENDED RELEASE ORAL DAILY
COMMUNITY
Start: 2022-02-14 | End: 2022-08-04 | Stop reason: DRUGHIGH

## 2022-03-10 RX ORDER — ACAMPROSATE CALCIUM 333 MG/1
TABLET, DELAYED RELEASE ORAL
COMMUNITY
Start: 2022-02-22 | End: 2022-03-10

## 2022-03-10 NOTE — PROGRESS NOTES
"Chief Complaint  Neck Pain (2 months /) and Back Pain (2 months)    Subjective          Radha presents to [facility]    You have chosen to receive care through a telehealth visit.  Do you consent to use a video/audio connection for your medical care today? Yes    51 year old female presented today for a video tele-health visit c/o ongoing neck pain for the past couple of months.  She has been seen by the [facility] and has had multiple communications with them in the last several months.  Pain is in her neck and upper back and described as cramping and tightening.  It does not radiate to any other area.  Is worse with movement and bending.  Pain 10/10    She has also been seen by [facility], and has not followed up with them    She is currently taking flexeril 10 mg po and does not feel like it is helping with her muscle spasms.  She has tried other muscle relaxers in the past.      She did not call she Spine Dr or her Pain management prior to this visit.  She feels like they are not doing anything to help her.  She declined a referral to a different neurosurgeon.        Review of Systems   Musculoskeletal: Positive for back pain and neck pain.   Neurological: Negative for dizziness and light-headedness.        Objective   Vital Signs: unable to obtain today  Height 64\"  Weight 232lbs  BMI 39.8  Vitals:    03/10/22 1450   PainSc: 10-Worst pain ever   PainLoc: Neck        Physical Exam  HENT:      Head: Normocephalic.   Pulmonary:      Effort: Pulmonary effort is normal. No respiratory distress.   Neurological:      General: No focal deficit present.      Mental Status: She is alert and oriented to person, place, and time.   Psychiatric:         Mood and Affect: Mood normal.         Behavior: Behavior normal.         Thought Content: Thought content normal.         Judgment: Judgment normal.          Result Review :     The following data was reviewed by: Hillary " SUMMER Randhawa on 03/10/2022:           Assessment and Plan    Diagnoses and all orders for this visit:    1. Cervical pain (neck) (Primary)    2. DDD (degenerative disc disease), cervical    3. Chronic bilateral thoracic back pain    Other orders  -     methylPREDNISolone (MEDROL) 4 MG dose pack; Take as directed on package instructions.  Dispense: 21 each; Refill: 0    Needs to follow up with Spine Center-  Last discussed referral to pain vs PT and MRI  Needs to follow up with Pain management  Declined PT  Not willing to prescribe more muscle relaxer related to taking flexeril prescribed by spine center at this time  Worried about Substance abuse History and abrupt withdraw with Zanaflex  Will give medral jasmyne until she can get in with specialists  Monitor b/p closely      Follow Up   Return if symptoms worsen or fail to improve.  Patient was given instructions and counseling regarding her condition or for health maintenance advice. Please see specific information pulled into the AVS if appropriate.

## 2022-03-14 ENCOUNTER — LAB (OUTPATIENT)
Dept: GASTROENTEROLOGY | Facility: CLINIC | Age: 52
End: 2022-03-14

## 2022-03-18 ENCOUNTER — TELEPHONE (OUTPATIENT)
Dept: FAMILY MEDICINE CLINIC | Facility: CLINIC | Age: 52
End: 2022-03-18

## 2022-03-18 NOTE — TELEPHONE ENCOUNTER
Pt called requesting a refill on her medrol dose pack to hold her over until she sees the spine center.

## 2022-03-20 ENCOUNTER — HOSPITAL ENCOUNTER (OUTPATIENT)
Dept: MRI IMAGING | Facility: HOSPITAL | Age: 52
Discharge: HOME | End: 2022-03-20
Admitting: NURSE PRACTITIONER

## 2022-03-20 DIAGNOSIS — R93.2 ABNORMAL LIVER ULTRASOUND: ICD-10-CM

## 2022-03-20 DIAGNOSIS — R10.11 RIGHT UPPER QUADRANT ABDOMINAL PAIN: ICD-10-CM

## 2022-03-20 PROCEDURE — 0 GADOBENATE DIMEGLUMINE 529 MG/ML SOLUTION: Performed by: NURSE PRACTITIONER

## 2022-03-20 PROCEDURE — 74183 MRI ABD W/O CNTR FLWD CNTR: CPT

## 2022-03-20 PROCEDURE — A9577 INJ MULTIHANCE: HCPCS | Performed by: NURSE PRACTITIONER

## 2022-03-20 RX ADMIN — GADOBENATE DIMEGLUMINE 20 ML: 529 INJECTION, SOLUTION INTRAVENOUS at 13:38

## 2022-03-21 ENCOUNTER — OFFICE VISIT (OUTPATIENT)
Dept: FAMILY MEDICINE CLINIC | Facility: CLINIC | Age: 52
End: 2022-03-21

## 2022-03-21 DIAGNOSIS — M54.2 CERVICAL PAIN (NECK): ICD-10-CM

## 2022-03-21 DIAGNOSIS — M50.30 DDD (DEGENERATIVE DISC DISEASE), CERVICAL: Primary | ICD-10-CM

## 2022-03-21 DIAGNOSIS — G89.29 CHRONIC BILATERAL THORACIC BACK PAIN: ICD-10-CM

## 2022-03-21 DIAGNOSIS — M54.6 CHRONIC BILATERAL THORACIC BACK PAIN: ICD-10-CM

## 2022-03-21 PROCEDURE — 99213 OFFICE O/P EST LOW 20 MIN: CPT | Performed by: NURSE PRACTITIONER

## 2022-03-21 RX ORDER — BROMPHENIRAMINE MALEATE, PSEUDOEPHEDRINE HYDROCHLORIDE, AND DEXTROMETHORPHAN HYDROBROMIDE 2; 30; 10 MG/5ML; MG/5ML; MG/5ML
SYRUP ORAL
COMMUNITY
End: 2022-06-16

## 2022-03-21 RX ORDER — MELOXICAM 15 MG/1
TABLET ORAL
COMMUNITY
End: 2022-03-30

## 2022-03-21 RX ORDER — AZITHROMYCIN 250 MG/1
TABLET, FILM COATED ORAL
COMMUNITY
End: 2022-03-30

## 2022-03-21 RX ORDER — PREDNISONE 10 MG/1
TABLET ORAL
COMMUNITY
End: 2022-03-30

## 2022-03-21 RX ORDER — ONDANSETRON 4 MG/1
TABLET, ORALLY DISINTEGRATING ORAL
COMMUNITY
End: 2022-03-30

## 2022-03-21 RX ORDER — METHOCARBAMOL 750 MG/1
TABLET, FILM COATED ORAL
COMMUNITY
End: 2022-03-30

## 2022-03-21 RX ORDER — HYDROXYZINE PAMOATE 100 MG/1
CAPSULE ORAL
COMMUNITY
End: 2022-03-30

## 2022-03-21 RX ORDER — FLUCONAZOLE 150 MG/1
TABLET ORAL
COMMUNITY
End: 2022-03-30

## 2022-03-21 RX ORDER — AMOXICILLIN 875 MG/1
TABLET, COATED ORAL
COMMUNITY
End: 2022-03-30

## 2022-03-21 NOTE — PROGRESS NOTES
Chief Complaint  Back Pain (Chronic back and neck pain)    Annabelle Gacria presents to [facility]  You have chosen to receive care through a telephone visit. Do you consent to use a telephone visit for your medical care today? Yes      52-year-old female presents today for a  telehealth visit.  She has had ongoing chronic neck and back pain.  She has been seen by [facility] and has had multiple communications with them in the last several months.  Pain is in her neck and upper back described as cramping and tightening.  It does not radiate to any other area.  Is worse with movement and bending.  Pain is 10 out of 10.  [facility] states that she has called to make a follow-up appointment with them is waiting on those details.    She is currently taking Flexeril 10 mg p.o. and does not feel like this is helping with her muscle spasms.  She tried other muscle relaxers in the past.    She did make calls to her spine doctor and pain management prior to this visit.  She has not received a call back for appointments.  She would like a referral to another neurosurgeon    She does have a substance abuse history    She has completed a Medrol pack given on 3/10/2022-explained to her is too early for another additional steroids.      Review of Systems   Constitutional: Negative for chills, fatigue and fever.   Eyes: Negative for visual disturbance.   Musculoskeletal: Positive for back pain and neck pain.   Neurological: Negative for dizziness and light-headedness.        Objective   Vital Signs:   There were no vitals filed for this visit.     Physical Exam  Constitutional:       General: She is not in acute distress.     Appearance: Normal appearance. She is normal weight. She is not ill-appearing.   HENT:      Head: Normocephalic.   Pulmonary:      Effort: Pulmonary effort is normal. No respiratory distress.   Skin:     General: Skin is warm.       Findings: No rash.   Neurological:      Mental Status: She is alert and oriented to person, place, and time.   Psychiatric:         Mood and Affect: Mood normal.          Result Review :     The following data was reviewed by: SUMMER Pratt on 03/21/2022:           Assessment and Plan    Diagnoses and all orders for this visit:    1. DDD (degenerative disc disease), cervical (Primary)  Comments:  [facility]  Orders:  -     Ambulatory Referral to Neurosurgery    2. Chronic bilateral thoracic back pain  Comments:  [facility]  would liek referral to different neurosurgery  Orders:  -     Ambulatory Referral to Neurosurgery    3. Cervical pain (neck)  Comments:  chronic-  unimproved  see pain managment  has called for an appointment    Needs to follow up with Spine Center-  Last discussed referral to pain vs PT and MRI  Needs to follow up with Pain management  Declined PT  Not willing to prescribe more muscle relaxer related to taking flexeril prescribed by spine center at this time  Worried about Substance abuse History and abrupt withdraw with Zanaflex   Medral jasmyne prescribed on 3/0/2022- to early for additional medication    Was asked to come for In person assessment if having continued pan and not able to get in with spine/pain management.  She changed her appointment to a telephone visit again.      This was an audio and video enabled telemedicine encounter.  Telephone visit lasted approx 18 minutes  Follow Up   Return if symptoms worsen or fail to improve.  Patient was given instructions and counseling regarding her condition or for health maintenance advice. Please see specific information pulled into the AVS if appropriate.

## 2022-03-22 RX ORDER — METHYLPREDNISOLONE 4 MG/1
TABLET ORAL
Qty: 21 TABLET | Refills: 0 | OUTPATIENT
Start: 2022-03-22

## 2022-03-28 ENCOUNTER — TELEPHONE (OUTPATIENT)
Dept: GASTROENTEROLOGY | Facility: CLINIC | Age: 52
End: 2022-03-28

## 2022-03-28 NOTE — PROGRESS NOTES
Please inform the patient that MRCP shows some small simple liver cysts otherwise normal.  Nothing worrisome.  Await EGD findings.

## 2022-03-28 NOTE — TELEPHONE ENCOUNTER
----- Message from SUMMER Pederson sent at 3/28/2022 12:37 PM EDT -----  Please inform the patient that MRCP shows some small simple liver cysts otherwise normal.  Nothing worrisome.  Await EGD findings.

## 2022-03-29 ENCOUNTER — TRANSCRIBE ORDERS (OUTPATIENT)
Dept: ADMINISTRATIVE | Facility: HOSPITAL | Age: 52
End: 2022-03-29

## 2022-03-29 DIAGNOSIS — Z01.818 OTHER SPECIFIED PRE-OPERATIVE EXAMINATION: Primary | ICD-10-CM

## 2022-03-30 ENCOUNTER — OFFICE VISIT (OUTPATIENT)
Dept: FAMILY MEDICINE CLINIC | Facility: CLINIC | Age: 52
End: 2022-03-30

## 2022-03-30 ENCOUNTER — LAB (OUTPATIENT)
Dept: LAB | Facility: HOSPITAL | Age: 52
End: 2022-03-30

## 2022-03-30 VITALS
SYSTOLIC BLOOD PRESSURE: 140 MMHG | HEART RATE: 115 BPM | OXYGEN SATURATION: 99 % | DIASTOLIC BLOOD PRESSURE: 90 MMHG | TEMPERATURE: 97.5 F | BODY MASS INDEX: 39.61 KG/M2 | HEIGHT: 64 IN | WEIGHT: 232 LBS

## 2022-03-30 DIAGNOSIS — M50.30 DDD (DEGENERATIVE DISC DISEASE), CERVICAL: ICD-10-CM

## 2022-03-30 DIAGNOSIS — Z01.818 OTHER SPECIFIED PRE-OPERATIVE EXAMINATION: ICD-10-CM

## 2022-03-30 DIAGNOSIS — F10.10 ETOH ABUSE: ICD-10-CM

## 2022-03-30 DIAGNOSIS — G62.9 NEUROPATHY: ICD-10-CM

## 2022-03-30 DIAGNOSIS — M54.2 CERVICAL PAIN (NECK): Primary | ICD-10-CM

## 2022-03-30 DIAGNOSIS — M79.10 MYALGIA: ICD-10-CM

## 2022-03-30 LAB — SARS-COV-2 ORF1AB RESP QL NAA+PROBE: NOT DETECTED

## 2022-03-30 PROCEDURE — C9803 HOPD COVID-19 SPEC COLLECT: HCPCS

## 2022-03-30 PROCEDURE — 99214 OFFICE O/P EST MOD 30 MIN: CPT | Performed by: NURSE PRACTITIONER

## 2022-03-30 PROCEDURE — U0004 COV-19 TEST NON-CDC HGH THRU: HCPCS

## 2022-03-30 NOTE — PROGRESS NOTES
Chief Complaint  discuss tests (US) and Muscle Pain (All over)    Subjective          sander presents to [facility]      52-year-old female presents to the office today complaining of ongoing muscle pain.  She has been seen multiple times for ongoing back and neck pain.  She has been prescribed multiple different muscle relaxers and NSAIDs that she states gives her no relief.  She has been on oral steroids several times recently last on 3 11/2022.  Discussed with patient here in pain management that oral steroid long-term use is not an option for her.  She has been seen by [facility] and has had multiple  communications with them in the last several months.  Pain is cervical bilateral.  She has pain in her bilateral upper extremities.  She has general upper extremity numbness and tingling.  She reports pain from the waist up that radiates to her neck and bilateral shoulders.  Described as cramping and tightening pain is 10 out of 10 does not radiate to any other area.  She has had no acute injury.    She is currently taking Flexeril 10 mg p.o. for muscle spasms.    Surgical consult with Dr. Epps performed 1/18/2022.  Consultation note reviewed. He reports cervical MRI shows no evidence of cord compression, mild facet arthrosis on the left C4-C6. Thoracic MRI ordered to assess neural impingement. Patient was referred to Dr. Velez as she is not a surgical candidate. Recommended continuation with meloxicam. Patient has not attended physical therapy as of date.    Patient has not attended therapy since November 2020 status post ACDF. Physical therapy recommended by Dr. Hernandez in January 2022 and primary care provider March 2022. Patient has not been compliant with recommendations as of date. Most recent ROQUE C6/7 performed 9/2020; relief was minimal if any; patient unsure of relief but she did not return for therapeutic injections.       She was seen by pain management yesterday in  office per their note:      Patient is not candidate for opioid medication management due to current treatment with Acamprosate (for maintenance of alcohol abstinence in ethanol-dependent patients after alcohol detoxification). Patient reports no relief with meloxicam, diclofenac, etodolac, Robaxin, Flexeril, tizanidine. Reports she still has all of these medications at home which have not been beneficial reports intolerance with gabapentin which caused nausea and headaches       PCP has initiated referral to another neurosurgeon for second opinion. I recommend physical therapy as recommended by neurosurgeon. I will provide referral to facility of choice. Regarding injection therapy, she is candidate for trigger point injection therapy for myofascial pain and cervical medial branch block/RFA for facet mediated pain. Regarding medication, she requests steroids and ibuprofen 800 mg TID. I will prescribe ibuprofen (GI prophylaxis recommended) but will not prescribe oral steroid therapy. Per PCP note, patient has recently been treated with oral steroids. Patient advised that long term steroid therapy she requests is not an option.  Patient will return to office in 30 days for review of PT efficacy and as scheduled for TPI injection therapy. Plan to discuss cervical medial branch block/RFA as well.       I recommend trigger point injection therapy as no relief with anti-inflammatory, NSAIDs, oral corticosteroid. Plan to start with TPI in effort to decrease myofascial inflammation (which seems primary) and achieve some level of pain relief so that she can participate in rehabilitation. Discussed benefits, risks, alternative therapies. I will submit order for #1 TPI bilateral cervical paraspinal/trap for authorization.           She is scheduled with GI/Dr. Williams BAXTER on 4/1/2022 for an EGD.  She has follow-up scheduled with them concerning continued GI problems and an abnormal US.  She has been seeing them  Sine  "2/2022.    The following portions of the patient's history were reviewed and updated as appropriate: allergies, current medications, past family history, past medical history, past social history, past surgical history, and problem list    gReview of Systems   Eyes: Negative for visual disturbance.   Respiratory: Negative for cough and shortness of breath.    Cardiovascular: Negative for chest pain, palpitations and leg swelling.   Musculoskeletal: Positive for arthralgias, back pain, myalgias, neck pain and neck stiffness.   Neurological: Negative for dizziness and light-headedness.        Objective   Vital Signs:   Vitals:    03/30/22 1139   BP: 140/90   Pulse: 115   Temp: 97.5 °F (36.4 °C)   SpO2: 99%   Weight: 105 kg (232 lb)   Height: 162 cm (63.78\")        Physical Exam  Constitutional:       General: She is not in acute distress.     Appearance: Normal appearance. She is normal weight. She is not ill-appearing.   HENT:      Head: Normocephalic.   Cardiovascular:      Rate and Rhythm: Normal rate and regular rhythm.      Pulses: Normal pulses.      Heart sounds: Normal heart sounds. No murmur heard.  Pulmonary:      Effort: Pulmonary effort is normal.      Breath sounds: Normal breath sounds.   Musculoskeletal:      Cervical back: Neck supple. No swelling.      Thoracic back: No swelling.      Lumbar back: No swelling.        Back:       Comments: Pain from neck to waiste 10/10   Skin:     General: Skin is warm.   Neurological:      Mental Status: She is alert and oriented to person, place, and time.   Psychiatric:         Mood and Affect: Mood normal.         Behavior: Behavior normal.         Thought Content: Thought content normal.         Judgment: Judgment normal.          Result Review :     The following data was reviewed by: SUMMER Pratt on 03/30/2022:      AP and Lat upright C-spine films (1/2022, Dr. Hernandez's office) show instrumentation in place C3-C4.         MRI cervical 12/2021 shows " status post ACDF C3-C4. No high-grade spinal canal or neural foraminal stenosis at this surgical level; advanced right facet arthropathy C5-C6 with moderate neural foraminal stenosis.           MRI thoracic 01/28/2022 shows Mild spinal canal stenosis at T6-T7 as well as right lateral recess stenosis. There is a small posterior central disc protrusion with associated disc osteophyte complex.     2. Mild spinal canal stenosis at T7-T8 as well as right lateral recess stenosis from rightward posterior disc osteophyte complex. 3. Mild spinal canal stenosis as well as bilateral lateral recess stenosis at T11-T12 from posterior disc osteophyte complex. Mild right neural foraminal stenosis. 4. Mild spinal canal stenosis and left lateral recess stenosis T12-L1 from posterior discussed by complex and left lateral recess disc protrusion.              Assessment and Plan    Diagnoses and all orders for this visit:    1. Cervical pain (neck) (Primary)  Comments:  chronic  Orders:  -     RAJIV  -     Rheumatoid Factor  -     C-reactive protein  -     Sedimentation Rate    2. Myalgia  Comments:  Has been seen by pain management    Orders:  -     [facility] (IgG / M)  -     Lyme Disease Antibodies, Total and IgM with Reflex to Line Blot  -     Lyme, IgM, Early Test / Reflex; Future    3. ETOH abuse  Comments:  states no current use      4. DDD (degenerative disc disease), cervical  Comments:  Sees Dr [facility]  referral to neurosurgeon -  will not see pt needs to follow current MD    5. Neuropathy  Comments:  chronic        Labs ordered  Keep follow ups with Pain management and [facility]  Recommend PT  Can not give oral steroids  Has flexeril at home  Saw pain management yesterday  ?Duloxatine  Consider with psych    Follow Up   Return if symptoms worsen or fail to improve.  Patient was given instructions and counseling regarding her condition or for health maintenance advice. Please see specific  information pulled into the AVS if appropriate.

## 2022-04-01 ENCOUNTER — HOSPITAL ENCOUNTER (OUTPATIENT)
Facility: HOSPITAL | Age: 52
Setting detail: HOSPITAL OUTPATIENT SURGERY
Discharge: HOME | End: 2022-04-01
Attending: INTERNAL MEDICINE | Admitting: INTERNAL MEDICINE

## 2022-04-01 ENCOUNTER — ANESTHESIA EVENT (OUTPATIENT)
Dept: GASTROENTEROLOGY | Facility: HOSPITAL | Age: 52
End: 2022-04-01

## 2022-04-01 ENCOUNTER — ANESTHESIA (OUTPATIENT)
Dept: GASTROENTEROLOGY | Facility: HOSPITAL | Age: 52
End: 2022-04-01

## 2022-04-01 VITALS
WEIGHT: 227.9 LBS | HEART RATE: 71 BPM | OXYGEN SATURATION: 97 % | HEIGHT: 64 IN | SYSTOLIC BLOOD PRESSURE: 130 MMHG | BODY MASS INDEX: 38.91 KG/M2 | RESPIRATION RATE: 20 BRPM | DIASTOLIC BLOOD PRESSURE: 90 MMHG

## 2022-04-01 DIAGNOSIS — R13.10 DYSPHAGIA, UNSPECIFIED TYPE: ICD-10-CM

## 2022-04-01 DIAGNOSIS — Z87.19 HISTORY OF ESOPHAGITIS: ICD-10-CM

## 2022-04-01 DIAGNOSIS — R10.13 DYSPEPSIA: ICD-10-CM

## 2022-04-01 PROCEDURE — 25010000002 PROPOFOL 10 MG/ML EMULSION: Performed by: NURSE ANESTHETIST, CERTIFIED REGISTERED

## 2022-04-01 PROCEDURE — S0260 H&P FOR SURGERY: HCPCS | Performed by: INTERNAL MEDICINE

## 2022-04-01 PROCEDURE — 43239 EGD BIOPSY SINGLE/MULTIPLE: CPT | Performed by: INTERNAL MEDICINE

## 2022-04-01 PROCEDURE — 88305 TISSUE EXAM BY PATHOLOGIST: CPT | Performed by: INTERNAL MEDICINE

## 2022-04-01 PROCEDURE — 43450 DILATE ESOPHAGUS 1/MULT PASS: CPT | Performed by: INTERNAL MEDICINE

## 2022-04-01 RX ORDER — LIDOCAINE HYDROCHLORIDE 20 MG/ML
INJECTION, SOLUTION INFILTRATION; PERINEURAL AS NEEDED
Status: DISCONTINUED | OUTPATIENT
Start: 2022-04-01 | End: 2022-04-01 | Stop reason: SURG

## 2022-04-01 RX ORDER — PROPOFOL 10 MG/ML
VIAL (ML) INTRAVENOUS AS NEEDED
Status: DISCONTINUED | OUTPATIENT
Start: 2022-04-01 | End: 2022-04-01 | Stop reason: SURG

## 2022-04-01 RX ORDER — SODIUM CHLORIDE, SODIUM LACTATE, POTASSIUM CHLORIDE, CALCIUM CHLORIDE 600; 310; 30; 20 MG/100ML; MG/100ML; MG/100ML; MG/100ML
30 INJECTION, SOLUTION INTRAVENOUS CONTINUOUS PRN
Status: DISCONTINUED | OUTPATIENT
Start: 2022-04-01 | End: 2022-04-01 | Stop reason: HOSPADM

## 2022-04-01 RX ORDER — SODIUM CHLORIDE 0.9 % (FLUSH) 0.9 %
10 SYRINGE (ML) INJECTION AS NEEDED
Status: DISCONTINUED | OUTPATIENT
Start: 2022-04-01 | End: 2022-04-01 | Stop reason: HOSPADM

## 2022-04-01 RX ORDER — PROPOFOL 10 MG/ML
VIAL (ML) INTRAVENOUS CONTINUOUS PRN
Status: DISCONTINUED | OUTPATIENT
Start: 2022-04-01 | End: 2022-04-01 | Stop reason: SURG

## 2022-04-01 RX ORDER — PANTOPRAZOLE SODIUM 40 MG/1
40 TABLET, DELAYED RELEASE ORAL
Qty: 180 TABLET | Refills: 3 | Status: SHIPPED | OUTPATIENT
Start: 2022-04-01

## 2022-04-01 RX ORDER — SODIUM CHLORIDE 0.9 % (FLUSH) 0.9 %
10 SYRINGE (ML) INJECTION EVERY 12 HOURS SCHEDULED
Status: DISCONTINUED | OUTPATIENT
Start: 2022-04-01 | End: 2022-04-01 | Stop reason: HOSPADM

## 2022-04-01 RX ADMIN — SODIUM CHLORIDE, POTASSIUM CHLORIDE, SODIUM LACTATE AND CALCIUM CHLORIDE 30 ML/HR: 600; 310; 30; 20 INJECTION, SOLUTION INTRAVENOUS at 11:19

## 2022-04-01 RX ADMIN — PROPOFOL 150 MG: 10 INJECTION, EMULSION INTRAVENOUS at 11:45

## 2022-04-01 RX ADMIN — LIDOCAINE HYDROCHLORIDE 50 MG: 20 INJECTION, SOLUTION INFILTRATION; PERINEURAL at 11:45

## 2022-04-01 RX ADMIN — Medication 250 MCG/KG/MIN: at 11:45

## 2022-04-01 NOTE — ANESTHESIA PREPROCEDURE EVALUATION
Anesthesia Evaluation     Patient summary reviewed and Nursing notes reviewed   no history of anesthetic complications:  NPO Solid Status: > 8 hours  NPO Liquid Status: > 8 hours           Airway   Mallampati: II  Dental      Pulmonary - negative pulmonary ROS and normal exam   Cardiovascular - negative cardio ROS and normal exam        Neuro/Psych  (+) psychiatric history Depression,    GI/Hepatic/Renal/Endo    (+) obesity, morbid obesity,      Musculoskeletal     Abdominal    Substance History   (+) alcohol use,      OB/GYN          Other   arthritis,                      Anesthesia Plan    ASA 3     MAC     intravenous induction     Anesthetic plan, all risks, benefits, and alternatives have been provided, discussed and informed consent has been obtained with: patient.        CODE STATUS:

## 2022-04-01 NOTE — H&P
"[facility]  Pre Procedure History & Physical    Chief Complaint:   Time for my egd   Subjective     HPI:   52 y.o. female with RUQ pain    Past Medical History:   Past Medical History:   Diagnosis Date   • H/O ETOH abuse        Family History:  Family History   Problem Relation Age of Onset   • Arthritis Mother    • Hyperlipidemia Mother    • Hypertension Mother    • Stroke Mother        Social History:   reports that she has never smoked. She does not have any smokeless tobacco history on file. She reports previous alcohol use. Drug use questions deferred to the physician.    Medications:   Medications Prior to Admission   Medication Sig Dispense Refill Last Dose   • busPIRone (BUSPAR) 5 MG tablet Take 5 mg by mouth.   Past Week at Unknown time   • cyclobenzaprine (FLEXERIL) 10 MG tablet cyclobenzaprine 10 mg tablet   3/31/2022 at Unknown time   • pantoprazole (PROTONIX) 40 MG EC tablet Take 1 tablet by mouth Daily. 90 tablet 3 3/31/2022 at Unknown time   • promethazine (PHENERGAN) 25 MG tablet Take 25 mg by mouth Every 6 (Six) Hours As Needed for Nausea or Vomiting.   Past Month at Unknown time   • QUEtiapine (SEROquel) 100 MG tablet    3/31/2022 at Unknown time   • QUEtiapine (SEROquel) 50 MG tablet Take 1 oral tablet at bedtime with Seroquel 100 mg.   3/31/2022 at Unknown time   • venlafaxine XR (EFFEXOR-XR) 150 MG 24 hr capsule Take 150 mg by mouth Daily.   3/31/2022 at Unknown time   • venlafaxine XR (EFFEXOR-XR) 37.5 MG 24 hr capsule Take 37.5 mg by mouth Daily.   3/31/2022 at Unknown time   • brompheniramine-pseudoephedrine-DM 30-2-10 MG/5ML syrup brompheniramine-pseudoephedrine-DM 2 mg-30 mg-10 mg/5 mL oral syrup   Take 10 mL by mouth every 4 hours as needed for cough   More than a month at Unknown time       Allergies:  Patient has no known allergies.    ROS:    Pertinent items are noted in HPI     Objective     Blood pressure 142/87, pulse 65, resp. rate 18, height 162.6 cm (64\"), " weight 103 kg (227 lb 14.4 oz), SpO2 98 %.    Physical Exam   Constitutional: Pt is oriented to person, place, and time and well-developed, well-nourished, and in no distress.   Abdominal: Soft.   Psychiatric: Mood, memory, affect and judgment normal.     Assessment/Plan     Diagnosis:  ruq pain    Anticipated Surgical Procedure:  egd     The risks, benefits, and alternatives of this procedure have been discussed with the patient or the responsible party- the patient understands and agrees to proceed.

## 2022-04-01 NOTE — ANESTHESIA POSTPROCEDURE EVALUATION
"Patient: Radha Cao    Procedure Summary     Date: 04/01/22 Room / Location: [facility] ENDOSCOPY 8 / [facility] ENDOSCOPY    Anesthesia Start: 1143 Anesthesia Stop: 1206    Procedure: ESOPHAGOGASTRODUODENOSCOPY WITH BIOPSIES AND DILATATION (N/A Esophagus) Diagnosis:       Dysphagia, unspecified type      Dyspepsia      History of esophagitis      (Dysphagia, unspecified type [R13.10])      (Dyspepsia [R10.13])      (History of esophagitis [Z87.19])    Surgeons: Jason Pina MD Provider: Pradeep Weir MD    Anesthesia Type: MAC ASA Status: 3          Anesthesia Type: MAC    Vitals  Vitals Value Taken Time   /77 04/01/22 1206   Temp     Pulse 79 04/01/22 1215   Resp 18 04/01/22 1206   SpO2 96 % 04/01/22 1215   Vitals shown include unvalidated device data.        Post Anesthesia Care and Evaluation    Patient location during evaluation: bedside  Patient participation: complete - patient participated  Level of consciousness: awake and alert  Pain management: adequate  Airway patency: patent  Anesthetic complications: No anesthetic complications    Cardiovascular status: acceptable  Respiratory status: acceptable  Hydration status: acceptable    Comments: /77   Pulse 76   Resp 18   Ht 162.6 cm (64\")   Wt 103 kg (227 lb 14.4 oz)   SpO2 98%   BMI 39.12 kg/m²       "

## 2022-04-04 LAB
LAB AP CASE REPORT: NORMAL
PATH REPORT.FINAL DX SPEC: NORMAL
PATH REPORT.GROSS SPEC: NORMAL

## 2022-04-10 NOTE — PROGRESS NOTES
Biopsies negative for celiac, H. pylori or eosinophilic esophagitis  Please schedule HIDA scan with [facility]  Office visit Steph 4 weeks

## 2022-04-11 ENCOUNTER — TELEPHONE (OUTPATIENT)
Dept: GASTROENTEROLOGY | Facility: CLINIC | Age: 52
End: 2022-04-11

## 2022-04-11 DIAGNOSIS — R10.11 RIGHT UPPER QUADRANT PAIN: Primary | ICD-10-CM

## 2022-04-13 ENCOUNTER — TELEPHONE (OUTPATIENT)
Dept: FAMILY MEDICINE CLINIC | Facility: CLINIC | Age: 52
End: 2022-04-13

## 2022-04-13 ENCOUNTER — OFFICE VISIT (OUTPATIENT)
Dept: GASTROENTEROLOGY | Facility: CLINIC | Age: 52
End: 2022-04-13

## 2022-04-13 ENCOUNTER — TELEPHONE (OUTPATIENT)
Dept: GASTROENTEROLOGY | Facility: CLINIC | Age: 52
End: 2022-04-13

## 2022-04-13 VITALS — WEIGHT: 217 LBS | BODY MASS INDEX: 37.05 KG/M2 | HEIGHT: 64 IN

## 2022-04-13 DIAGNOSIS — R10.13 DYSPEPSIA: ICD-10-CM

## 2022-04-13 DIAGNOSIS — K59.00 CONSTIPATION, UNSPECIFIED CONSTIPATION TYPE: ICD-10-CM

## 2022-04-13 DIAGNOSIS — R10.11 RIGHT UPPER QUADRANT PAIN: Primary | ICD-10-CM

## 2022-04-13 PROCEDURE — 99442 PR PHYS/QHP TELEPHONE EVALUATION 11-20 MIN: CPT | Performed by: NURSE PRACTITIONER

## 2022-04-13 RX ORDER — DICLOFENAC SODIUM 75 MG/1
75 TABLET, DELAYED RELEASE ORAL
COMMUNITY
Start: 2022-04-12 | End: 2022-06-16

## 2022-04-13 RX ORDER — CARISOPRODOL 350 MG/1
350 TABLET ORAL
COMMUNITY
Start: 2022-04-12

## 2022-04-13 NOTE — TELEPHONE ENCOUNTER
Patient called. Advised as per Dr. Pina's note. Offered patient an appointment today at 1430. She declined stating she has injured her neck and cannot drive in traffic. States she prefers an appointment between 1000a-1300 when traffic is not as bad or could she get a phone call instead of a visit today.   Update to Steph.

## 2022-04-13 NOTE — PROGRESS NOTES
Chief Complaint   Patient presents with   • Abdominal Pain     HPI    You have chosen to receive care through a telephone visit. Do you consent to use a telephone visit for your medical care today? yes    Radha Cao is a  52 y.o. female here for a follow up visit for abdominal pain.    This patient follows with Dr. Pina and myself.    Personally reviewed work-up for her symptoms has included the following:    MRCP 3/20/2022 --simple hepatic cyst otherwise normal.  EGD    4/1/2022 --no endoscopic esophageal abnormality to explain patient's dysphagia.  Empiric dilation performed.  Normal stomach.  Normal duodenum.  Patient to use twice daily dosing Protonix and arrange HIDA scan.  Pathology was negative for celiac disease, H. pylori, or EOE.    Telehealth visit/phone call conducted today as patient had several questions regarding recent pathology.  She still struggle with upper abdominal pain and reports constipation.  Pain describes aching sensation that comes and goes.  Still needs to schedule for HIDA scan.  Compliant with twice daily dosing PPI therapy but does not feel like it is doing much to help.  She is following a low-fat diet.    Bowels are moving every 3 or 5 days with small volume stools.  She has not tried anything recently over-the-counter to improve her bowel pattern.  No rectal bleeding.  Normal colonoscopy in 2016.    Past Medical History:   Diagnosis Date   • H/O ETOH abuse        Past Surgical History:   Procedure Laterality Date   • COLONOSCOPY N/A 09/20/2016    [facility]   • ENDOSCOPY N/A 04/01/2022    Procedure: ESOPHAGOGASTRODUODENOSCOPY WITH BIOPSIES AND DILATATION;  Surgeon: Jason Pina MD;  Location: [facility] ENDOSCOPY;  Service: Gastroenterology;  Laterality: N/A;  pre: abd pain, distention  post: normal   • HYSTERECTOMY         Scheduled Meds:     Continuous Infusions: No current facility-administered medications for this visit.      PRN Meds:     No Known Allergies    Social  History     Socioeconomic History   • Marital status:    Tobacco Use   • Smoking status: Never Smoker   • Smokeless tobacco: Never Used   Substance and Sexual Activity   • Alcohol use: Not Currently     Comment: quit 11/23/2020   • Drug use: Defer       Family History   Problem Relation Age of Onset   • Arthritis Mother    • Hyperlipidemia Mother    • Hypertension Mother    • Stroke Mother        Review of Systems   Constitutional: Negative for activity change, appetite change, fatigue, fever and unexpected weight change.   HENT: Negative for trouble swallowing.    Respiratory: Negative for apnea, cough, choking, chest tightness, shortness of breath and wheezing.    Cardiovascular: Negative for chest pain, palpitations and leg swelling.   Gastrointestinal: Positive for abdominal pain and constipation. Negative for abdominal distention, anal bleeding, blood in stool, diarrhea, nausea, rectal pain and vomiting.     Physical Exam unable to be performed as visit was conducted over the phone.      Assessment    Diagnoses and all orders for this visit:    1. Right upper quadrant pain (Primary)    2. Dyspepsia    3. Constipation, unspecified constipation type    Other orders  -     linaclotide (Linzess) 72 MCG capsule capsule; Take 1 capsule by mouth Daily for 30 days. To be taken without food  Dispense: 30 capsule; Refill: 0      Plan    Await HIDA scan results.  Continue twice daily dose PPI therapy.  Add FD guard which she can purchase over-the-counter.  Begin Linzess low-dose 72 mcg once daily to improve bowel pattern.  Follow-up and further recommendations pending the aforementioned work-up.    (Telehealth visit/phone call 20 minutes duration.)          SUMMER Pederson  [facility]  [address] [place] [address]  Office: [phone]

## 2022-04-13 NOTE — TELEPHONE ENCOUNTER
Caller: Radha Cao    Relationship: Self    Best call back number: [phone]    Who are you requesting to speak with (clinical staff, provider,  specific staff member): CLINICAL STAFF     What was the call regarding: WANTS TO KNOW WHAT THE NEXT STEPS ARE TO HER CHEST WALL INFLAMMATION PLEASE CALL AND ADVISE

## 2022-04-13 NOTE — TELEPHONE ENCOUNTER
I do not know anything about chest wall inflammation-  she has been seeing GI and last note says they ordered a Hida scan.  I have not seen her for anything other than neck and back pain that she needs to follow up with pain management for -  has an appointment coming up

## 2022-04-13 NOTE — TELEPHONE ENCOUNTER
Patient called. Advised as per verbal order of Steph. Patient can be a tele-visit at 1430. Patient is agreeable and will have her phone close by for the phone call.

## 2022-04-13 NOTE — TELEPHONE ENCOUNTER
----- Message from Lorrie Xiao RN sent at 4/13/2022  8:06 AM EDT -----  Regarding: FW: Results    ----- Message -----  From: Jason Pina MD  Sent: 4/12/2022  11:59 AM EDT  To: Steph Carrizales APRN, #  Subject: FW: Results                                      I am sorry can you please explain to this patient that I am confused by her questions and we should definitely get her into see one of our nurse practitioner soon.  Office visit nurse practitioner or physician assistant sometime this week thank you  ----- Message -----  From: Elzbieta Perez RN  Sent: 4/11/2022   1:05 PM EDT  To: Jason Pina MD  Subject: FW: Results                                        ----- Message -----  From: Radha Cao  Sent: 4/11/2022  12:32 PM EDT  To: [facility]  Subject: Results                                          awesome but what is all the GREY/TAN FRAGMENTS THROUGH THEY ESOPHAGUS AND TUMMY..AND THE STRETCHing How long does that last. and would it be safe to say that i should have an emergency steroid to keep with me in case it swells up again...this is the 3rd time since surgery

## 2022-04-14 NOTE — TELEPHONE ENCOUNTER
Pt was informed.  Pt scheduled today to discuss with you vv.  Pt states she will come in at another time if you need to see her in person, however she wanted to talk to you first.  Thanks

## 2022-04-15 ENCOUNTER — TELEPHONE (OUTPATIENT)
Dept: GASTROENTEROLOGY | Facility: CLINIC | Age: 52
End: 2022-04-15

## 2022-05-09 ENCOUNTER — TELEPHONE (OUTPATIENT)
Dept: FAMILY MEDICINE CLINIC | Facility: CLINIC | Age: 52
End: 2022-05-09
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
## 2022-05-30 ENCOUNTER — APPOINTMENT (OUTPATIENT)
Dept: GENERAL RADIOLOGY | Facility: HOSPITAL | Age: 52
End: 2022-05-30

## 2022-05-30 ENCOUNTER — APPOINTMENT (OUTPATIENT)
Dept: CT IMAGING | Facility: HOSPITAL | Age: 52
End: 2022-05-30

## 2022-05-30 ENCOUNTER — HOSPITAL ENCOUNTER (EMERGENCY)
Facility: HOSPITAL | Age: 52
Discharge: NURSING, REHAB OR LONG-TERM CARE | End: 2022-05-30
Attending: EMERGENCY MEDICINE | Admitting: EMERGENCY MEDICINE

## 2022-05-30 VITALS
WEIGHT: 197 LBS | BODY MASS INDEX: 33.63 KG/M2 | OXYGEN SATURATION: 97 % | SYSTOLIC BLOOD PRESSURE: 142 MMHG | RESPIRATION RATE: 16 BRPM | HEIGHT: 64 IN | DIASTOLIC BLOOD PRESSURE: 78 MMHG | HEART RATE: 80 BPM | TEMPERATURE: 98.7 F
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
## 2022-05-30 DIAGNOSIS — S00.03XA CONTUSION OF SCALP, INITIAL ENCOUNTER: ICD-10-CM
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
## 2022-05-30 DIAGNOSIS — S16.1XXA STRAIN OF NECK MUSCLE, INITIAL ENCOUNTER: ICD-10-CM

## 2022-05-30 DIAGNOSIS — S92.301A MULTIPLE CLOSED FRACTURES OF METATARSAL BONE OF RIGHT FOOT, INITIAL ENCOUNTER: Primary | ICD-10-CM

## 2022-05-30 LAB
AMPHET+METHAMPHET UR QL: NEGATIVE
BACTERIA UR QL AUTO: NORMAL /HPF
BARBITURATES UR QL SCN: NEGATIVE
BENZODIAZ UR QL SCN: NEGATIVE
BILIRUB UR QL STRIP: NEGATIVE
CANNABINOIDS SERPL QL: POSITIVE
CLARITY UR: CLEAR
COCAINE UR QL: NEGATIVE
COLOR UR: YELLOW
GLUCOSE UR STRIP-MCNC: NEGATIVE MG/DL
HGB UR QL STRIP.AUTO: NEGATIVE
HYALINE CASTS UR QL AUTO: NORMAL /LPF
KETONES UR QL STRIP: NEGATIVE
LEUKOCYTE ESTERASE UR QL STRIP.AUTO: ABNORMAL
METHADONE UR QL SCN: NEGATIVE
NITRITE UR QL STRIP: NEGATIVE
OPIATES UR QL: NEGATIVE
OXYCODONE UR QL SCN: POSITIVE
PH UR STRIP.AUTO: 7.5 [PH] (ref 5–8)
PROT UR QL STRIP: NEGATIVE
RBC # UR STRIP: NORMAL /HPF
REF LAB TEST METHOD: NORMAL
SP GR UR STRIP: 1.01 (ref 1–1.03)
SQUAMOUS #/AREA URNS HPF: NORMAL /HPF
UROBILINOGEN UR QL STRIP: ABNORMAL
WBC # UR STRIP: NORMAL /HPF

## 2022-05-30 PROCEDURE — 80307 DRUG TEST PRSMV CHEM ANLYZR: CPT | Performed by: PHYSICIAN ASSISTANT

## 2022-05-30 PROCEDURE — 73590 X-RAY EXAM OF LOWER LEG: CPT

## 2022-05-30 PROCEDURE — 70450 CT HEAD/BRAIN W/O DYE: CPT

## 2022-05-30 PROCEDURE — 71101 X-RAY EXAM UNILAT RIBS/CHEST: CPT

## 2022-05-30 PROCEDURE — 99284 EMERGENCY DEPT VISIT MOD MDM: CPT

## 2022-05-30 PROCEDURE — 73630 X-RAY EXAM OF FOOT: CPT

## 2022-05-30 PROCEDURE — 81001 URINALYSIS AUTO W/SCOPE: CPT | Performed by: PHYSICIAN ASSISTANT

## 2022-05-30 PROCEDURE — 73610 X-RAY EXAM OF ANKLE: CPT

## 2022-05-30 PROCEDURE — 63710000001 ONDANSETRON ODT 4 MG TABLET DISPERSIBLE: Performed by: PHYSICIAN ASSISTANT

## 2022-05-30 PROCEDURE — 72125 CT NECK SPINE W/O DYE: CPT

## 2022-05-30 RX ORDER — ONDANSETRON 4 MG/1
4 TABLET, ORALLY DISINTEGRATING ORAL ONCE
Status: COMPLETED | OUTPATIENT
Start: 2022-05-30 | End: 2022-05-30

## 2022-05-30 RX ORDER — LORAZEPAM 1 MG/1
1 TABLET ORAL ONCE
Status: COMPLETED | OUTPATIENT
Start: 2022-05-30 | End: 2022-05-30

## 2022-05-30 RX ORDER — OXYCODONE HYDROCHLORIDE AND ACETAMINOPHEN 5; 325 MG/1; MG/1
2 TABLET ORAL ONCE
Status: COMPLETED | OUTPATIENT
Start: 2022-05-30 | End: 2022-05-30

## 2022-05-30 RX ADMIN — ONDANSETRON 4 MG: 4 TABLET, ORALLY DISINTEGRATING ORAL at 08:00

## 2022-05-30 RX ADMIN — OXYCODONE AND ACETAMINOPHEN 2 TABLET: 5; 325 TABLET ORAL at 08:00

## 2022-05-30 RX ADMIN — LORAZEPAM 1 MG: 1 TABLET ORAL at 09:09

## 2022-06-14 ENCOUNTER — TELEPHONE (OUTPATIENT)
Dept: FAMILY MEDICINE CLINIC | Facility: CLINIC | Age: 52
End: 2022-06-14
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
## 2022-06-16 ENCOUNTER — OFFICE VISIT (OUTPATIENT)
Dept: FAMILY MEDICINE CLINIC | Facility: CLINIC | Age: 52
End: 2022-06-16

## 2022-06-16 VITALS
HEART RATE: 98 BPM | HEIGHT: 64 IN | TEMPERATURE: 97.8 F | OXYGEN SATURATION: 99 % | RESPIRATION RATE: 18 BRPM | BODY MASS INDEX: 34.05 KG/M2

## 2022-06-16 DIAGNOSIS — I82.411 ACUTE DEEP VEIN THROMBOSIS (DVT) OF FEMORAL VEIN OF RIGHT LOWER EXTREMITY: ICD-10-CM

## 2022-06-16 DIAGNOSIS — R11.0 NAUSEA: ICD-10-CM
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
## 2022-06-16 DIAGNOSIS — S22.49XA CLOSED FRACTURE OF MULTIPLE RIBS, UNSPECIFIED LATERALITY, INITIAL ENCOUNTER: ICD-10-CM

## 2022-06-16 DIAGNOSIS — S36.113D LACERATION OF LIVER, SUBSEQUENT ENCOUNTER: ICD-10-CM

## 2022-06-16 DIAGNOSIS — S37.812D: ICD-10-CM

## 2022-06-16 DIAGNOSIS — S82.891E TYPE I OR II OPEN FRACTURE OF RIGHT ANKLE WITH ROUTINE HEALING, SUBSEQUENT ENCOUNTER: ICD-10-CM

## 2022-06-16 PROCEDURE — 99495 TRANSJ CARE MGMT MOD F2F 14D: CPT | Performed by: NURSE PRACTITIONER

## 2022-06-16 RX ORDER — CELECOXIB 50 MG/1
50 CAPSULE ORAL 2 TIMES DAILY
COMMUNITY
Start: 2022-04-07 | End: 2022-06-16

## 2022-06-16 RX ORDER — OXYCODONE HCL 20 MG/1
20 TABLET, FILM COATED, EXTENDED RELEASE ORAL
COMMUNITY
Start: 2022-05-10 | End: 2022-06-16

## 2022-06-16 RX ORDER — ACAMPROSATE CALCIUM 333 MG/1
TABLET, DELAYED RELEASE ORAL
COMMUNITY
Start: 2022-04-19 | End: 2022-06-16

## 2022-06-16 RX ORDER — BISACODYL 10 MG
SUPPOSITORY, RECTAL RECTAL
COMMUNITY
Start: 2022-05-11 | End: 2022-06-16

## 2022-06-16 RX ORDER — APIXABAN 5 MG/1
TABLET, FILM COATED ORAL
COMMUNITY
Start: 2022-06-14 | End: 2022-07-12 | Stop reason: SDUPTHER

## 2022-06-16 RX ORDER — TIZANIDINE 4 MG/1
4 TABLET ORAL NIGHTLY PRN
Qty: 30 TABLET | Refills: 0 | Status: CANCELLED | OUTPATIENT
Start: 2022-06-16 | End: 2022-07-16

## 2022-06-16 RX ORDER — ONDANSETRON 8 MG/1
8 TABLET, ORALLY DISINTEGRATING ORAL EVERY 8 HOURS PRN
Qty: 30 TABLET | Refills: 0 | Status: SHIPPED | OUTPATIENT
Start: 2022-06-16 | End: 2022-07-15

## 2022-06-16 RX ORDER — BUSPIRONE HYDROCHLORIDE 30 MG/1
TABLET ORAL
COMMUNITY
Start: 2022-05-02 | End: 2023-02-09

## 2022-06-16 RX ORDER — BUSPIRONE HYDROCHLORIDE 10 MG/1
10 TABLET ORAL 3 TIMES DAILY PRN
COMMUNITY
Start: 2022-04-19 | End: 2022-06-16

## 2022-06-16 RX ORDER — ENOXAPARIN SODIUM 100 MG/ML
INJECTION SUBCUTANEOUS
COMMUNITY
Start: 2022-05-11 | End: 2022-06-16

## 2022-06-16 RX ORDER — DICLOFENAC SODIUM 75 MG/1
75 TABLET, DELAYED RELEASE ORAL
COMMUNITY
Start: 2022-04-12 | End: 2022-06-16
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
## 2022-06-17 ENCOUNTER — HOME HEALTH ADMISSION (OUTPATIENT)
Dept: HOME HEALTH SERVICES | Facility: HOME HEALTHCARE | Age: 52
End: 2022-06-17

## 2022-06-23 ENCOUNTER — TELEPHONE (OUTPATIENT)
Dept: ONCOLOGY | Facility: CLINIC | Age: 52
End: 2022-06-23

## 2022-06-23 NOTE — TELEPHONE ENCOUNTER
HUB TO READ:  Left msg for pt to call with new ins information.  Please staff msg me when complete.  Thanks, Swathi with [employer]

## 2022-06-24 ENCOUNTER — TELEPHONE (OUTPATIENT)
Dept: FAMILY MEDICINE CLINIC | Facility: CLINIC | Age: 52
End: 2022-06-24

## 2022-06-24 DIAGNOSIS — S82.891E TYPE I OR II OPEN FRACTURE OF RIGHT ANKLE WITH ROUTINE HEALING, SUBSEQUENT ENCOUNTER: ICD-10-CM
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
## 2022-06-24 DIAGNOSIS — S22.49XA CLOSED FRACTURE OF MULTIPLE RIBS, UNSPECIFIED LATERALITY, INITIAL ENCOUNTER: ICD-10-CM

## 2022-06-29 ENCOUNTER — TELEPHONE (OUTPATIENT)
Dept: FAMILY MEDICINE CLINIC | Facility: CLINIC | Age: 52
End: 2022-06-29

## 2022-06-29 NOTE — ADDENDUM NOTE
Addended by: HARRIS GERMAIN on: 6/29/2022 03:31 PM     Modules accepted: Level of Service, SmartSet

## 2022-07-01 ENCOUNTER — TELEPHONE (OUTPATIENT)
Dept: ONCOLOGY | Facility: CLINIC | Age: 52
End: 2022-07-01

## 2022-07-01 ENCOUNTER — REFERRAL TRIAGE (OUTPATIENT)
Dept: CASE MANAGEMENT | Facility: OTHER | Age: 52
End: 2022-07-01

## 2022-07-01 NOTE — TELEPHONE ENCOUNTER
----- Message from Danuta Lopez RN sent at 7/1/2022 10:32 AM EDT -----  Regarding: FW: Aappointment  is wrong its July 12    ----- Message -----  From: Radha Cao  Sent: 7/1/2022  10:29 AM EDT  To: [facility]  Subject: Aappointment  is wrong its July 12               Theres a couple of them

## 2022-07-05 ENCOUNTER — PATIENT OUTREACH (OUTPATIENT)
Dept: CASE MANAGEMENT | Facility: OTHER | Age: 52
End: 2022-07-05
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
## 2022-07-05 DIAGNOSIS — S82.891B TYPE I OR II OPEN FRACTURE OF RIGHT ANKLE, INITIAL ENCOUNTER: ICD-10-CM

## 2022-07-05 NOTE — OUTREACH NOTE
AMBULATORY CASE MANAGEMENT NOTE    Name and Relationship of Patient/Support Person:  -     I have called the following home health agencies to follow up denials:    [facility] denied due to low staffing issues  [facility] has met capacity for medicaid patients  [facility] is checking with referral intake and will return my call.     Education Documentation  No documentation found.        ROXANA ROD  Ambulatory Case Management    7/5/2022, 15:23 EDT

## 2022-07-05 NOTE — OUTREACH NOTE
AMBULATORY CASE MANAGEMENT NOTE    Name and Relationship of Patient/Support Person: Radha Cao J - Self    Called to discuss HRCM and follow up on PT needs. MSW also to follow. Left message on voicemail to return my call.     Education Documentation  No documentation found.        ROXANA ROD  Ambulatory Case Management    7/5/2022, 12:09 EDT

## 2022-07-06 ENCOUNTER — PATIENT OUTREACH (OUTPATIENT)
Dept: CASE MANAGEMENT | Facility: OTHER | Age: 52
End: 2022-07-06

## 2022-07-06 ENCOUNTER — TELEPHONE (OUTPATIENT)
Dept: CASE MANAGEMENT | Facility: OTHER | Age: 52
End: 2022-07-06
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
## 2022-07-06 DIAGNOSIS — S82.891B TYPE I OR II OPEN FRACTURE OF RIGHT ANKLE, INITIAL ENCOUNTER: ICD-10-CM

## 2022-07-06 NOTE — TELEPHONE ENCOUNTER
I am covering for  Kiarra. I am in the process of finding a home health agency that will accept her for physical therapy. I have called Ms Cao several times with no answer. I will keep you informed with any progress. Thank you

## 2022-07-06 NOTE — OUTREACH NOTE
AMBULATORY CASE MANAGEMENT NOTE    Name and Relationship of Patient/Support Person: Radha Cao J - Self  Self    Called and spoke with  Florina. He reports she will be receiving walking brace on Monday 7/11/22. They are requesting referral to home health for physical therapy. This referral has already been placed by Ms Edis. [facility], [facility] and [facility] have all declined. I have faxed information to [facility] admissions for their review. She is unable to ambulate but very short distances due to pain. Mr Cao is assisting with all ADL's. She has DME in the home (wheelchair, walker and bedside commode). He reports if she is required to leave home for physical therapy, transportation could be a problem due to his employment. MSW is aware of situation and will be contacted if needed. Outreach has been scheduled for later this week.     Education Documentation  No documentation found.        ROXANA ROD  Ambulatory Case Management    7/6/2022, 14:53 EDT

## 2022-07-07 DIAGNOSIS — I82.411 ACUTE DEEP VEIN THROMBOSIS (DVT) OF FEMORAL VEIN OF RIGHT LOWER EXTREMITY: Primary | ICD-10-CM

## 2022-07-08 ENCOUNTER — PATIENT OUTREACH (OUTPATIENT)
Dept: CASE MANAGEMENT | Facility: OTHER | Age: 52
End: 2022-07-08
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
## 2022-07-08 DIAGNOSIS — S82.891B TYPE I OR II OPEN FRACTURE OF RIGHT ANKLE, INITIAL ENCOUNTER: ICD-10-CM

## 2022-07-08 NOTE — OUTREACH NOTE
AMBULATORY CASE MANAGEMENT NOTE    Name and Relationship of Patient/Support Person: NashRadha J - Self    [facility]\"" home health has declined to accept patient. I have sent referral to [facility] for review. Spoke with Mr Cao and he is aware.    Education Documentation  No documentation found.        ROXANA ROD  Ambulatory Case Management    7/8/2022, 12:01 EDT

## 2022-07-11 ENCOUNTER — TELEPHONE (OUTPATIENT)
Dept: ONCOLOGY | Facility: CLINIC | Age: 52
End: 2022-07-11

## 2022-07-11 ENCOUNTER — PATIENT OUTREACH (OUTPATIENT)
Dept: CASE MANAGEMENT | Facility: OTHER | Age: 52
End: 2022-07-11

## 2022-07-11 DIAGNOSIS — S82.891B TYPE I OR II OPEN FRACTURE OF RIGHT ANKLE, INITIAL ENCOUNTER: ICD-10-CM
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
## 2022-07-11 NOTE — OUTREACH NOTE
AMBULATORY CASE MANAGEMENT NOTE    Name and Relationship of Patient/Support Person:  -     Message was left on my voicemail that [facility] has declined to admit. Kiarra CAICEDO made aware.     Education Documentation  No documentation found.        ROXANA ROD  Ambulatory Case Management    7/11/2022, 08:38 EDT

## 2022-07-11 NOTE — TELEPHONE ENCOUNTER
Attempted to call no answer left message to return call.  Patient is scheduled for July 12 to follow up with Dr Lala if patient can make this appointment

## 2022-07-12 ENCOUNTER — PATIENT OUTREACH (OUTPATIENT)
Dept: CASE MANAGEMENT | Facility: OTHER | Age: 52
End: 2022-07-12

## 2022-07-12 ENCOUNTER — APPOINTMENT (OUTPATIENT)
Dept: LAB | Facility: HOSPITAL | Age: 52
End: 2022-07-12

## 2022-07-12 ENCOUNTER — TELEPHONE (OUTPATIENT)
Dept: ONCOLOGY | Facility: CLINIC | Age: 52
End: 2022-07-12

## 2022-07-12 RX ORDER — APIXABAN 5 MG/1
5 TABLET, FILM COATED ORAL EVERY 12 HOURS SCHEDULED
Qty: 60 TABLET | Refills: 0 | Status: SHIPPED | OUTPATIENT
Start: 2022-07-12 | End: 2022-08-12 | Stop reason: SDUPTHER

## 2022-07-12 NOTE — OUTREACH NOTE
Social Work Assessment  Questions/Answers    Flowsheet Row Most Recent Value   Referral Source physician   Reason for Consult community resources, care coordination/care conference, transportation, financial concerns   People in Home spouse   Current Living Arrangements home   Employment Status unemployed   Financial/Environmental Concerns unable to afford food, unemployed   Application for Public Assistance pending public assistance pending number        SDOH updated and reviewed with the patient during this program:  Financial Resource Strain: High Risk   • Difficulty of Paying Living Expenses: Very hard      Food Insecurity: Food Insecurity Present   • Worried About Running Out of Food in the Last Year: Often true   • Ran Out of Food in the Last Year: Often true      Transportation Needs: Unmet Transportation Needs   • Lack of Transportation (Medical): Yes   • Lack of Transportation (Non-Medical): Yes      Housing Stability: Low Risk    • Unable to Pay for Housing in the Last Year: No   • Number of Places Lived in the Last Year: 1   • Unstable Housing in the Last Year: No      SDOH updated and reviewed with the patient during this program:  Financial Resource Strain: High Risk   • Difficulty of Paying Living Expenses: Very hard      Food Insecurity: Food Insecurity Present   • Worried About Running Out of Food in the Last Year: Often true   • Ran Out of Food in the Last Year: Often true      Transportation Needs: Unmet Transportation Needs   • Lack of Transportation (Medical): Yes   • Lack of Transportation (Non-Medical): Yes      Housing Stability: Low Risk    • Unable to Pay for Housing in the Last Year: No   • Number of Places Lived in the Last Year: 1   • Unstable Housing in the Last Year: No      Continuing Care   [facility]    [address] [facility], [facility] [address]    Phone: [phone]   [facility]    [address] [place], [place] [address], [facility] [address]    Phone: [phone]   [facility] [address]    [address], [address], [facility] [address]    Phone: [phone]   [facility]    [address], [facility] [address]    Phone: [phone]   [place] [facility]    [address], [facility] [address]    Phone: [phone]    Resource for: Food Insecurity   [facility]    [address] [facility] [address], [place] [address]    Phone: [phone]   [facility]    [address] [facility] [address]    Phone: [phone]   [facility]    [address] [place] [address]    Phone: [phone]     Patient Outreach  MSW outreach to patient to discuss community resources available. Patient discussed that she has received a disconnect notice from electricity company. MSW discussed utilizing the [employer] in [facility] for assistance with electricity bill. Patient will contact this agency today. Patient needs assistance with transportation and likely has transportation through [employer]. MSW will contact to get form for primary care office to complete. Patient also needs assistance with food, she stated she knows where the food pantries are in her area but would like more information about Supplemental Nutritional Assistance Program. MSW emailed this resource to patient's email so she can apply for benefits. MSW will follow-up with patient in 1-2 weeks to discuss additional needs and resources.     MARELY CID -   Ambulatory Case Management    7/12/2022, 16:18 EDT

## 2022-07-12 NOTE — OUTREACH NOTE
AMBULATORY CASE MANAGEMENT NOTE    Name and Relationship of Patient/Support Person: Radha Cao - Self    Care Coordination    [facility]  She states they should be able to accept for in home PT.  Inbasket message for Hillary Randhawa and Emily Mora requesting the office fax the order and demographic and insurance coverage to [facility] at [phone] to attention Emily in home therapy.     Patient Outreach      VM for patient and asked she return call to RN-ACM.      JIMENA ESTRADA  Ambulatory Case Management    7/12/2022, 09:43 EDT

## 2022-07-12 NOTE — TELEPHONE ENCOUNTER
Patient states her  is not feeling well and she cannot keep appointment today 7/12/2022. Per Dr Lala due to patient cannot be rescheduled until next week patient's Eliquis can be refilled one time. Lorna @ appointment desk is working on getting patient rescheduled.  Prescription refill sent to [facility] in [facility]

## 2022-07-12 NOTE — TELEPHONE ENCOUNTER
----- Message from Sharon Flores Rep sent at 7/12/2022 12:26 PM EDT -----  Regarding: NEW PT REQ REFILL  HELLO!  THIS PT CALLED WANTING TO RS HER NEW PT APPT FOR TODAY AND REQ Thursday BUT DR. ORTIZ IS BOOKED.  SHE ASKED TO SPEAK W/ YOU IN REGARDS OF ELIQUIS.  PLEASE CALL PT WHEN YOU GET A CHANCE.  THANK YOU

## 2022-07-13 ENCOUNTER — PATIENT OUTREACH (OUTPATIENT)
Dept: CASE MANAGEMENT | Facility: OTHER | Age: 52
End: 2022-07-13

## 2022-07-13 NOTE — OUTREACH NOTE
Care Coordination    MSW contacted [employer] and spoke with Zeinab. Patient has transportation through [employer] and no additional forms need to be completed to schedule transportation to physician appointments. Patient will need to call [employer] 3 days prior to appointment to schedule ride.     Patient Outreach    MSW called patient to notify her about transportation benefits and left voicemail and call back number.    Patient Outreach    Patient returned call to MSW and was notified that [employer] can provide rides to upcoming physician appointments. Patient provided with phone number for [employer] and was notified that she will need to schedule 3 days in advance. Patient did not have additional questions.    MARELY CID -   Ambulatory Case Management    7/13/2022, 11:40 EDT

## 2022-07-13 NOTE — OUTREACH NOTE
AMBULATORY CASE MANAGEMENT NOTE    Name and Relationship of Patient/Support Person: SAIRA ANN - Emergency Contact    Incoming call from spouse.  Saira returned VM from RN-ACM.  Notified that [facility] home [facility] has accepted.  He states he thinks his spouse spoke with someone about PT coming yesterday.  He will give her the message.  Asked that he ask her to return to RN-ACM.  He denies any other needs at this time. Scheduled an outreach for 3 weeks to follow up with patient.  Saira was agreeable with this plan.    JIMENA ESTRADA  Ambulatory Case Management    7/13/2022, 13:55 EDT

## 2022-07-15 DIAGNOSIS — R11.0 NAUSEA: ICD-10-CM

## 2022-07-15 RX ORDER — ONDANSETRON 8 MG/1
TABLET, ORALLY DISINTEGRATING ORAL
Qty: 30 TABLET | Refills: 0 | Status: SHIPPED | OUTPATIENT
Start: 2022-07-15 | End: 2022-09-22 | Stop reason: SDUPTHER

## 2022-07-19 ENCOUNTER — LAB (OUTPATIENT)
Dept: LAB | Facility: HOSPITAL | Age: 52
End: 2022-07-19

## 2022-07-19 ENCOUNTER — CONSULT (OUTPATIENT)
Dept: ONCOLOGY | Facility: CLINIC | Age: 52
End: 2022-07-19

## 2022-07-19 VITALS
SYSTOLIC BLOOD PRESSURE: 124 MMHG | HEART RATE: 95 BPM | RESPIRATION RATE: 20 BRPM | HEIGHT: 64 IN | BODY MASS INDEX: 31.92 KG/M2 | TEMPERATURE: 98 F | WEIGHT: 187 LBS | OXYGEN SATURATION: 98 % | DIASTOLIC BLOOD PRESSURE: 84 MMHG

## 2022-07-19 DIAGNOSIS — Z79.01 ANTICOAGULATION ADEQUATE: ICD-10-CM

## 2022-07-19 DIAGNOSIS — I82.401 LEG DVT (DEEP VENOUS THROMBOEMBOLISM), ACUTE, RIGHT: Primary | ICD-10-CM

## 2022-07-19 DIAGNOSIS — I82.411 ACUTE DEEP VEIN THROMBOSIS (DVT) OF FEMORAL VEIN OF RIGHT LOWER EXTREMITY: ICD-10-CM

## 2022-07-19 LAB
BASOPHILS # BLD AUTO: 0.04 10*3/MM3 (ref 0–0.2)
BASOPHILS NFR BLD AUTO: 0.6 % (ref 0–1.5)
DEPRECATED RDW RBC AUTO: 47.2 FL (ref 37–54)
EOSINOPHIL # BLD AUTO: 0.38 10*3/MM3 (ref 0–0.4)
EOSINOPHIL NFR BLD AUTO: 5.4 % (ref 0.3–6.2)
ERYTHROCYTE [DISTWIDTH] IN BLOOD BY AUTOMATED COUNT: 14.7 % (ref 12.3–15.4)
HCT VFR BLD AUTO: 38 % (ref 34–46.6)
HGB BLD-MCNC: 12.7 G/DL (ref 12–15.9)
IMM GRANULOCYTES # BLD AUTO: 0.01 10*3/MM3 (ref 0–0.05)
IMM GRANULOCYTES NFR BLD AUTO: 0.1 % (ref 0–0.5)
LYMPHOCYTES # BLD AUTO: 2.9 10*3/MM3 (ref 0.7–3.1)
LYMPHOCYTES NFR BLD AUTO: 41.5 % (ref 19.6–45.3)
MCH RBC QN AUTO: 29.3 PG (ref 26.6–33)
MCHC RBC AUTO-ENTMCNC: 33.4 G/DL (ref 31.5–35.7)
MCV RBC AUTO: 87.6 FL (ref 79–97)
MONOCYTES # BLD AUTO: 0.62 10*3/MM3 (ref 0.1–0.9)
MONOCYTES NFR BLD AUTO: 8.9 % (ref 5–12)
NEUTROPHILS NFR BLD AUTO: 3.04 10*3/MM3 (ref 1.7–7)
NEUTROPHILS NFR BLD AUTO: 43.5 % (ref 42.7–76)
NRBC BLD AUTO-RTO: 0 /100 WBC (ref 0–0.2)
PLATELET # BLD AUTO: 271 10*3/MM3 (ref 140–450)
PMV BLD AUTO: 10.4 FL (ref 6–12)
RBC # BLD AUTO: 4.34 10*6/MM3 (ref 3.77–5.28)
WBC NRBC COR # BLD: 6.99 10*3/MM3 (ref 3.4–10.8)

## 2022-07-19 PROCEDURE — 36415 COLL VENOUS BLD VENIPUNCTURE: CPT

## 2022-07-19 PROCEDURE — 85306 CLOT INHIBIT PROT S FREE: CPT | Performed by: INTERNAL MEDICINE

## 2022-07-19 PROCEDURE — 85300 ANTITHROMBIN III ACTIVITY: CPT | Performed by: INTERNAL MEDICINE

## 2022-07-19 PROCEDURE — 85025 COMPLETE CBC W/AUTO DIFF WBC: CPT

## 2022-07-19 PROCEDURE — 99204 OFFICE O/P NEW MOD 45 MIN: CPT | Performed by: INTERNAL MEDICINE

## 2022-07-19 RX ORDER — LORAZEPAM 1 MG/1
TABLET ORAL
COMMUNITY
Start: 2022-07-15

## 2022-07-19 RX ORDER — DOXYCYCLINE HYCLATE 100 MG
TABLET ORAL
COMMUNITY
Start: 2022-05-18 | End: 2022-08-04

## 2022-07-19 RX ORDER — ASCORBIC ACID 500 MG
TABLET ORAL
COMMUNITY
Start: 2022-05-18 | End: 2023-02-09

## 2022-07-19 RX ORDER — FLUCONAZOLE 150 MG/1
TABLET ORAL
COMMUNITY
Start: 2022-07-15
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
## 2022-07-20 ENCOUNTER — PATIENT MESSAGE (OUTPATIENT)
Dept: ONCOLOGY | Facility: CLINIC | Age: 52
End: 2022-07-20

## 2022-07-20 LAB
F5 GENE MUT ANL BLD/T: NORMAL
FACTOR II, DNA ANALYSIS: NORMAL

## 2022-07-20 PROCEDURE — 81240 F2 GENE: CPT | Performed by: INTERNAL MEDICINE

## 2022-07-20 PROCEDURE — 81241 F5 GENE: CPT | Performed by: INTERNAL MEDICINE

## 2022-07-21 LAB — PROT C ACT/NOR PPP: 158 % (ref 73–180)

## 2022-07-22 DIAGNOSIS — B37.2 CANDIDIASIS OF SKIN AND NAIL: ICD-10-CM

## 2022-07-22 LAB
AT III PPP CHRO-ACNC: >140 % (ref 90–134)
PROT S ACT/NOR PPP: 112 % (ref 70–127)
PROT S FREE PPP-ACNC: 112 % (ref 49–138)

## 2022-07-22 RX ORDER — FLUCONAZOLE 150 MG/1
TABLET ORAL
Qty: 1 TABLET | Refills: 2 | OUTPATIENT
Start: 2022-07-22

## 2022-07-22 RX ORDER — FLUCONAZOLE 150 MG/1
TABLET ORAL
OUTPATIENT
Start: 2022-07-22

## 2022-07-22 NOTE — TELEPHONE ENCOUNTER
Caller: Radha Cao    Relationship: Self    Best call back number: [phone]  Requested Prescriptions:   Requested Prescriptions     Pending Prescriptions Disp Refills   • fluconazole (DIFLUCAN) 150 MG tablet          Pharmacy where request should be sent: [facility] - [address] - [phone]  - [phone] FX     Additional details provided by patient: OUT  Does the patient have less than a 3 day supply:  [x] Yes  [] No    Patrick Caldwell   07/22/22 13:29 EDT

## 2022-07-25 ENCOUNTER — HOSPITAL ENCOUNTER (OUTPATIENT)
Dept: CARDIOLOGY | Facility: HOSPITAL | Age: 52
Discharge: HOME | End: 2022-07-25
Admitting: INTERNAL MEDICINE

## 2022-07-25 DIAGNOSIS — I82.401 LEG DVT (DEEP VENOUS THROMBOEMBOLISM), ACUTE, RIGHT: ICD-10-CM

## 2022-07-25 LAB
BH CV LOW VAS RIGHT DISTAL FEMORAL SPONT: 1
BH CV LOW VAS RIGHT GASTRONEMIUS VESSEL: 1
BH CV LOW VAS RIGHT POPLITEAL SPONT: 1
BH CV LOW VAS RIGHT PROXIMAL FEMORAL SPONT: 1
BH CV LOWER VASCULAR LEFT COMMON FEMORAL AUGMENT: NORMAL
BH CV LOWER VASCULAR LEFT COMMON FEMORAL COMPETENT: NORMAL
BH CV LOWER VASCULAR LEFT COMMON FEMORAL COMPRESS: NORMAL
BH CV LOWER VASCULAR LEFT COMMON FEMORAL PHASIC: NORMAL
BH CV LOWER VASCULAR LEFT COMMON FEMORAL SPONT: NORMAL
BH CV LOWER VASCULAR RIGHT COMMON FEMORAL AUGMENT: NORMAL
BH CV LOWER VASCULAR RIGHT COMMON FEMORAL COMPETENT: NORMAL
BH CV LOWER VASCULAR RIGHT COMMON FEMORAL COMPRESS: NORMAL
BH CV LOWER VASCULAR RIGHT COMMON FEMORAL PHASIC: NORMAL
BH CV LOWER VASCULAR RIGHT COMMON FEMORAL SPONT: NORMAL
BH CV LOWER VASCULAR RIGHT DISTAL FEMORAL AUGMENT: NORMAL
BH CV LOWER VASCULAR RIGHT DISTAL FEMORAL COMPETENT: NORMAL
BH CV LOWER VASCULAR RIGHT DISTAL FEMORAL COMPRESS: NORMAL
BH CV LOWER VASCULAR RIGHT DISTAL FEMORAL PHASIC: NORMAL
BH CV LOWER VASCULAR RIGHT DISTAL FEMORAL SPONT: NORMAL
BH CV LOWER VASCULAR RIGHT DISTAL FEMORAL THROMBUS: NORMAL
BH CV LOWER VASCULAR RIGHT GASTRONEMIUS COMPRESS: NORMAL
BH CV LOWER VASCULAR RIGHT GASTRONEMIUS THROMBUS: NORMAL
BH CV LOWER VASCULAR RIGHT GREATER SAPH AK COMPRESS: NORMAL
BH CV LOWER VASCULAR RIGHT GREATER SAPH BK COMPRESS: NORMAL
BH CV LOWER VASCULAR RIGHT LESSER SAPH COMPRESS: NORMAL
BH CV LOWER VASCULAR RIGHT MID FEMORAL AUGMENT: NORMAL
BH CV LOWER VASCULAR RIGHT MID FEMORAL COMPETENT: NORMAL
BH CV LOWER VASCULAR RIGHT MID FEMORAL COMPRESS: NORMAL
BH CV LOWER VASCULAR RIGHT MID FEMORAL PHASIC: NORMAL
BH CV LOWER VASCULAR RIGHT MID FEMORAL SPONT: NORMAL
BH CV LOWER VASCULAR RIGHT PERONEAL COMPRESS: NORMAL
BH CV LOWER VASCULAR RIGHT POPLITEAL AUGMENT: NORMAL
BH CV LOWER VASCULAR RIGHT POPLITEAL COMPRESS: NORMAL
BH CV LOWER VASCULAR RIGHT POPLITEAL PHASIC: NORMAL
BH CV LOWER VASCULAR RIGHT POPLITEAL SPONT: NORMAL
BH CV LOWER VASCULAR RIGHT POPLITEAL THROMBUS: NORMAL
BH CV LOWER VASCULAR RIGHT POSTERIOR TIBIAL COMPRESS: NORMAL
BH CV LOWER VASCULAR RIGHT PROFUNDA FEMORAL COMPRESS: NORMAL
BH CV LOWER VASCULAR RIGHT PROXIMAL FEMORAL COMPRESS: NORMAL
BH CV LOWER VASCULAR RIGHT PROXIMAL FEMORAL THROMBUS: NORMAL
BH CV LOWER VASCULAR RIGHT SAPHENOFEMORAL JUNCTION COMPRESS: NORMAL
MAXIMAL PREDICTED HEART RATE: 168 BPM
STRESS TARGET HR: 143 BPM

## 2022-07-25 PROCEDURE — 93971 EXTREMITY STUDY: CPT

## 2022-07-25 RX ORDER — FLUCONAZOLE 150 MG/1
150 TABLET ORAL ONCE
Qty: 2 TABLET | Refills: 2 | Status: SHIPPED | OUTPATIENT
Start: 2022-07-25 | End: 2022-07-25

## 2022-08-03 ENCOUNTER — PATIENT OUTREACH (OUTPATIENT)
Dept: CASE MANAGEMENT | Facility: OTHER | Age: 52
End: 2022-08-03

## 2022-08-03 ENCOUNTER — TELEPHONE (OUTPATIENT)
Dept: ONCOLOGY | Facility: CLINIC | Age: 52
End: 2022-08-03

## 2022-08-03 NOTE — TELEPHONE ENCOUNTER
Encouraged patient to keep appointment with Dr Lala on 8/4/2022 to review labs and decide future appointments.  Patient v/u
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
## 2022-08-03 NOTE — TELEPHONE ENCOUNTER
Caller: Radha Cao    Relationship: Self    Best call back number: [phone]    What is the best time to reach you: ANYTIME    Who are you requesting to speak with (clinical staff, provider,  specific staff member):DOCTOR, NURSE    What was the call regarding: PT HAS APPT 8/4 AT , IS USUALLY SEEN AT [facility] LOCATION, PT WOULD LIKE TO KNOW IF DR. ORTIZ FEELS SHE SHOULD BE SEEN TOMORROW OR IF IT WOULD BE OKAY TO RESCHEDULE FOR A FEW WEEKS FROM KNOW AT [facility].    PLEASE CALL ASAP TO ADVISE AS APPT IS IN THE MORNING    Do you require a callback: YES

## 2022-08-04 ENCOUNTER — LAB (OUTPATIENT)
Dept: OTHER | Facility: HOSPITAL | Age: 52
End: 2022-08-04

## 2022-08-04 ENCOUNTER — OFFICE VISIT (OUTPATIENT)
Dept: ONCOLOGY | Facility: CLINIC | Age: 52
End: 2022-08-04

## 2022-08-04 VITALS
TEMPERATURE: 97.3 F | RESPIRATION RATE: 18 BRPM | WEIGHT: 197 LBS | HEIGHT: 64 IN | HEART RATE: 90 BPM | BODY MASS INDEX: 33.63 KG/M2 | OXYGEN SATURATION: 97 % | SYSTOLIC BLOOD PRESSURE: 155 MMHG | DIASTOLIC BLOOD PRESSURE: 79 MMHG

## 2022-08-04 DIAGNOSIS — Z79.01 ANTICOAGULATION ADEQUATE: ICD-10-CM

## 2022-08-04 DIAGNOSIS — I82.411 ACUTE DEEP VEIN THROMBOSIS (DVT) OF FEMORAL VEIN OF RIGHT LOWER EXTREMITY: ICD-10-CM

## 2022-08-04 DIAGNOSIS — I82.401 LEG DVT (DEEP VENOUS THROMBOEMBOLISM), ACUTE, RIGHT: ICD-10-CM

## 2022-08-04 DIAGNOSIS — I82.401 LEG DVT (DEEP VENOUS THROMBOEMBOLISM), ACUTE, RIGHT: Primary | ICD-10-CM

## 2022-08-04 LAB
BASOPHILS # BLD AUTO: 0.05 10*3/MM3 (ref 0–0.2)
BASOPHILS NFR BLD AUTO: 0.7 % (ref 0–1.5)
DEPRECATED RDW RBC AUTO: 47.8 FL (ref 37–54)
EOSINOPHIL # BLD AUTO: 0.25 10*3/MM3 (ref 0–0.4)
EOSINOPHIL NFR BLD AUTO: 3.6 % (ref 0.3–6.2)
ERYTHROCYTE [DISTWIDTH] IN BLOOD BY AUTOMATED COUNT: 14.9 % (ref 12.3–15.4)
HCT VFR BLD AUTO: 38.7 % (ref 34–46.6)
HGB BLD-MCNC: 12.7 G/DL (ref 12–15.9)
IMM GRANULOCYTES # BLD AUTO: 0.02 10*3/MM3 (ref 0–0.05)
IMM GRANULOCYTES NFR BLD AUTO: 0.3 % (ref 0–0.5)
LYMPHOCYTES # BLD AUTO: 3.73 10*3/MM3 (ref 0.7–3.1)
LYMPHOCYTES NFR BLD AUTO: 53.7 % (ref 19.6–45.3)
MCH RBC QN AUTO: 28.9 PG (ref 26.6–33)
MCHC RBC AUTO-ENTMCNC: 32.8 G/DL (ref 31.5–35.7)
MCV RBC AUTO: 88.2 FL (ref 79–97)
MONOCYTES # BLD AUTO: 0.55 10*3/MM3 (ref 0.1–0.9)
MONOCYTES NFR BLD AUTO: 7.9 % (ref 5–12)
NEUTROPHILS NFR BLD AUTO: 2.35 10*3/MM3 (ref 1.7–7)
NEUTROPHILS NFR BLD AUTO: 33.8 % (ref 42.7–76)
NRBC BLD AUTO-RTO: 0 /100 WBC (ref 0–0.2)
PLATELET # BLD AUTO: 347 10*3/MM3 (ref 140–450)
PMV BLD AUTO: 10.3 FL (ref 6–12)
RBC # BLD AUTO: 4.39 10*6/MM3 (ref 3.77–5.28)
WBC NRBC COR # BLD: 6.95 10*3/MM3 (ref 3.4–10.8)

## 2022-08-04 PROCEDURE — 85025 COMPLETE CBC W/AUTO DIFF WBC: CPT | Performed by: INTERNAL MEDICINE

## 2022-08-04 PROCEDURE — 99214 OFFICE O/P EST MOD 30 MIN: CPT | Performed by: INTERNAL MEDICINE
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
## 2022-08-08 ENCOUNTER — TELEPHONE (OUTPATIENT)
Dept: ONCOLOGY | Facility: CLINIC | Age: 52
End: 2022-08-08

## 2022-08-08 NOTE — TELEPHONE ENCOUNTER
Patient is inquiring how daughter can be seen by Dr Lala. Explained to patient that patient's daughter needs to be referred to Dr Lala by PCP or GYN. Instructed patient to call once referral is placed and will try to get patient scheduled with Dr Lala.  Patient v/u

## 2022-08-12 ENCOUNTER — TELEPHONE (OUTPATIENT)
Dept: ONCOLOGY | Facility: CLINIC | Age: 52
End: 2022-08-12

## 2022-08-12 RX ORDER — APIXABAN 5 MG/1
5 TABLET, FILM COATED ORAL EVERY 12 HOURS SCHEDULED
Qty: 60 TABLET | Refills: 0 | Status: SHIPPED | OUTPATIENT
Start: 2022-08-12 | End: 2022-09-12

## 2022-08-12 NOTE — TELEPHONE ENCOUNTER
Caller: Nash Radha J    Relationship: Self    Best call back number: [phone]    Requested Prescriptions:   Requested Prescriptions     Pending Prescriptions Disp Refills   • Eliquis 5 MG tablet tablet 60 tablet 0     Sig: Take 1 tablet by mouth Every 12 (Twelve) Hours.        Pharmacy where request should be sent: [facility] - [address] - [phone]  - [phone]     Additional details provided by patient: PATIENT HAS 1 PILL LEFT    Does the patient have less than a 3 day supply:  [x] Yes  [] No    Sharon Klein Rep   08/12/22 11:25 EDT

## 2022-09-06 ENCOUNTER — PATIENT OUTREACH (OUTPATIENT)
Dept: CASE MANAGEMENT | Facility: OTHER | Age: 52
End: 2022-09-06

## 2022-09-06 NOTE — OUTREACH NOTE
AMBULATORY CASE MANAGEMENT NOTE    Name and Relationship of Patient/Support Person:  -     Chart review completed.  Outreach for monthly chart review is scheduled for October.      JIMENA ESTRADA  Ambulatory Case Management    9/6/2022, 08:34 EDT

## 2022-09-12 RX ORDER — APIXABAN 5 MG/1
TABLET, FILM COATED ORAL
Qty: 60 TABLET | Refills: 6 | Status: SHIPPED | OUTPATIENT
Start: 2022-09-12 | End: 2023-02-09 | Stop reason: SDUPTHER

## 2022-09-22 DIAGNOSIS — R11.0 NAUSEA: ICD-10-CM

## 2022-09-22 RX ORDER — ONDANSETRON 8 MG/1
8 TABLET, ORALLY DISINTEGRATING ORAL EVERY 8 HOURS PRN
Qty: 30 TABLET | Refills: 2 | Status: SHIPPED | OUTPATIENT
Start: 2022-09-22 | End: 2022-10-19 | Stop reason: SDUPTHER

## 2022-10-04 ENCOUNTER — PATIENT OUTREACH (OUTPATIENT)
Dept: CASE MANAGEMENT | Facility: OTHER | Age: 52
End: 2022-10-04

## 2022-10-04 NOTE — OUTREACH NOTE
AMBULATORY CASE MANAGEMENT NOTE    Name and Relationship of Patient/Support Person: Radha Cao J - Self    Adult Patient Profile  Questions/Answers    Flowsheet Row Most Recent Value   Barriers to Taking Medication as Prescribed none        Patient Outreach    Outgoing call to the patient for follow up.  She denies needs at this time.  She is planning to change PCP and is working with her [employer] insurance for assist as she prefers with this change. She prefers a chart review in one month and this has been scheduled.      JIMENA ESTRADA  Ambulatory Case Management    10/4/2022, 16:44 EDT

## 2022-10-07 ENCOUNTER — TELEPHONE (OUTPATIENT)
Dept: ONCOLOGY | Facility: CLINIC | Age: 52
End: 2022-10-07

## 2022-10-07 DIAGNOSIS — I82.411 ACUTE DEEP VEIN THROMBOSIS (DVT) OF FEMORAL VEIN OF RIGHT LOWER EXTREMITY: ICD-10-CM

## 2022-10-07 DIAGNOSIS — I82.401 LEG DVT (DEEP VENOUS THROMBOEMBOLISM), ACUTE, RIGHT: Primary | ICD-10-CM

## 2022-10-07 DIAGNOSIS — M79.604 PAIN OF RIGHT LOWER EXTREMITY: ICD-10-CM

## 2022-10-07 NOTE — TELEPHONE ENCOUNTER
"Discussed with Dr Lala, recommended to see patient in first-second week of November with cbc checked. Called back patient to inform, patient verbalized it is \"hard to come over\", very stressful, and is a process. Patient stated she wants to have a scan as she is worried how it is now and Lauro will be too long and wants it check soon.    1652 Called back patient and made aware that Dr Lala has ordered duplex scan of right leg and will have  reach out to her for the appt. Patient v/u.  "

## 2022-10-07 NOTE — TELEPHONE ENCOUNTER
Caller: Radha Cao    Relationship: Self    Best call back number: [phone]    What is the best time to reach you: ANYTIME     CAN LEAVE VOICEMAIL IF UNABLE TO REACH WITH NAME AND NUMBER TO CALL BACK.     Who are you requesting to speak with (clinical staff, provider,  specific staff member): KATIE         What was the call regarding: WOULD LIKE TO SPEAK WITH KATIE,  CONCERNED REGARDING HER OTHER SMALLER BLOOD CLOTS  IN THE LOWER VEINS  ASIDE FROM THE MAJOR DVT IN FEMORAL ARTERY    WANTING TO KNOW WHY WAITING SO LONG TO DO NEXT DOPPLER , IS NOT UNTIL JAN 24TH, WOULD FEEL MORE AT EASE HAVING DONE SOONER THEN LATER OR EVEN JUST AN ULTRASOUND POSSIBLY     ALSO HAS BEEN HAVING MORE PAIN NEAR THE GROIN AND WHERE LEG INJURY STARTS, AT THIS POINT IS HARD TO SEPARATE THE CAUSE OF THE PAIN IF FROM THE LEG INJURY OR DVT     CONCERNED  ALSO DUE TO DIFFICULTY LIMITATION OF MOVEMENT FROM INJURY AND HAVING THESE BLOOD CLOTS.         Do you require a callback: YES TO FOLLOW UP

## 2022-10-14 ENCOUNTER — APPOINTMENT (OUTPATIENT)
Dept: CARDIOLOGY | Facility: HOSPITAL | Age: 52
End: 2022-10-14

## 2022-10-14 ENCOUNTER — HOSPITAL ENCOUNTER (OUTPATIENT)
Dept: CARDIOLOGY | Facility: HOSPITAL | Age: 52
Discharge: HOME | End: 2022-10-14
Admitting: INTERNAL MEDICINE

## 2022-10-14 ENCOUNTER — TELEPHONE (OUTPATIENT)
Dept: ONCOLOGY | Facility: CLINIC | Age: 52
End: 2022-10-14

## 2022-10-14 DIAGNOSIS — M79.604 PAIN OF RIGHT LOWER EXTREMITY: ICD-10-CM

## 2022-10-14 DIAGNOSIS — I82.401 LEG DVT (DEEP VENOUS THROMBOEMBOLISM), ACUTE, RIGHT: ICD-10-CM

## 2022-10-14 DIAGNOSIS — I82.411 ACUTE DEEP VEIN THROMBOSIS (DVT) OF FEMORAL VEIN OF RIGHT LOWER EXTREMITY: ICD-10-CM

## 2022-10-14 LAB
BH CV LOW VAS RIGHT DISTAL FEMORAL SPONT: 1
BH CV LOW VAS RIGHT GASTRONEMIUS VESSEL: 1
BH CV LOW VAS RIGHT POPLITEAL SPONT: 1
BH CV LOWER VASCULAR LEFT COMMON FEMORAL AUGMENT: NORMAL
BH CV LOWER VASCULAR LEFT COMMON FEMORAL COMPETENT: NORMAL
BH CV LOWER VASCULAR LEFT COMMON FEMORAL COMPRESS: NORMAL
BH CV LOWER VASCULAR LEFT COMMON FEMORAL PHASIC: NORMAL
BH CV LOWER VASCULAR LEFT COMMON FEMORAL SPONT: NORMAL
BH CV LOWER VASCULAR RIGHT COMMON FEMORAL AUGMENT: NORMAL
BH CV LOWER VASCULAR RIGHT COMMON FEMORAL COMPETENT: NORMAL
BH CV LOWER VASCULAR RIGHT COMMON FEMORAL COMPRESS: NORMAL
BH CV LOWER VASCULAR RIGHT COMMON FEMORAL PHASIC: NORMAL
BH CV LOWER VASCULAR RIGHT COMMON FEMORAL SPONT: NORMAL
BH CV LOWER VASCULAR RIGHT DISTAL FEMORAL AUGMENT: NORMAL
BH CV LOWER VASCULAR RIGHT DISTAL FEMORAL COMPETENT: NORMAL
BH CV LOWER VASCULAR RIGHT DISTAL FEMORAL COMPRESS: NORMAL
BH CV LOWER VASCULAR RIGHT DISTAL FEMORAL PHASIC: NORMAL
BH CV LOWER VASCULAR RIGHT DISTAL FEMORAL SPONT: NORMAL
BH CV LOWER VASCULAR RIGHT DISTAL FEMORAL THROMBUS: NORMAL
BH CV LOWER VASCULAR RIGHT GASTRONEMIUS COMPRESS: NORMAL
BH CV LOWER VASCULAR RIGHT GASTRONEMIUS THROMBUS: NORMAL
BH CV LOWER VASCULAR RIGHT GREATER SAPH AK COMPRESS: NORMAL
BH CV LOWER VASCULAR RIGHT GREATER SAPH BK COMPRESS: NORMAL
BH CV LOWER VASCULAR RIGHT LESSER SAPH COMPRESS: NORMAL
BH CV LOWER VASCULAR RIGHT MID FEMORAL AUGMENT: NORMAL
BH CV LOWER VASCULAR RIGHT MID FEMORAL COMPETENT: NORMAL
BH CV LOWER VASCULAR RIGHT MID FEMORAL COMPRESS: NORMAL
BH CV LOWER VASCULAR RIGHT MID FEMORAL PHASIC: NORMAL
BH CV LOWER VASCULAR RIGHT MID FEMORAL SPONT: NORMAL
BH CV LOWER VASCULAR RIGHT PERONEAL COMPRESS: NORMAL
BH CV LOWER VASCULAR RIGHT POPLITEAL AUGMENT: NORMAL
BH CV LOWER VASCULAR RIGHT POPLITEAL COMPETENT: NORMAL
BH CV LOWER VASCULAR RIGHT POPLITEAL COMPRESS: NORMAL
BH CV LOWER VASCULAR RIGHT POPLITEAL PHASIC: NORMAL
BH CV LOWER VASCULAR RIGHT POPLITEAL SPONT: NORMAL
BH CV LOWER VASCULAR RIGHT POPLITEAL THROMBUS: NORMAL
BH CV LOWER VASCULAR RIGHT POSTERIOR TIBIAL COMPRESS: NORMAL
BH CV LOWER VASCULAR RIGHT PROFUNDA FEMORAL COMPRESS: NORMAL
BH CV LOWER VASCULAR RIGHT PROXIMAL FEMORAL COMPRESS: NORMAL
BH CV LOWER VASCULAR RIGHT SAPHENOFEMORAL JUNCTION COMPRESS: NORMAL
MAXIMAL PREDICTED HEART RATE: 168 BPM
STRESS TARGET HR: 143 BPM

## 2022-10-14 PROCEDURE — 93971 EXTREMITY STUDY: CPT

## 2022-10-14 NOTE — TELEPHONE ENCOUNTER
Reviewed patient's doppler from today 10/14/2022 with Dr Lala. Called and informed patient doppler showed improvement of the DVT in size and to continue Eliquis .  Patient v/u

## 2022-10-19 DIAGNOSIS — R11.0 NAUSEA: ICD-10-CM

## 2022-10-20 RX ORDER — ONDANSETRON 8 MG/1
8 TABLET, ORALLY DISINTEGRATING ORAL EVERY 8 HOURS PRN
Qty: 30 TABLET | Refills: 0 | Status: SHIPPED | OUTPATIENT
Start: 2022-10-20

## 2022-11-03 ENCOUNTER — PATIENT OUTREACH (OUTPATIENT)
Dept: CASE MANAGEMENT | Facility: OTHER | Age: 52
End: 2022-11-03

## 2022-11-03 NOTE — OUTREACH NOTE
AMBULATORY CASE MANAGEMENT NOTE    Name and Relationship of Patient/Support Person:  -     Patient Outreach    Patient chart review completed.  She declines further outreaches.  Program closed.    JIMENA ESTRADA  Ambulatory Case Management    11/3/2022, 14:39 EDT

## 2022-11-08 DIAGNOSIS — B37.2 CANDIDIASIS OF SKIN AND NAIL: ICD-10-CM

## 2022-11-09 RX ORDER — NYSTATIN 100000 U/G
CREAM TOPICAL
Qty: 30 G | Refills: 1 | OUTPATIENT
Start: 2022-11-09

## 2022-11-09 RX ORDER — TRIAMCINOLONE ACETONIDE 1 MG/G
CREAM TOPICAL
Qty: 30 G | Refills: 1 | OUTPATIENT
Start: 2022-11-09

## 2022-12-29 ENCOUNTER — TELEPHONE (OUTPATIENT)
Dept: ONCOLOGY | Facility: CLINIC | Age: 52
End: 2022-12-29

## 2022-12-29 NOTE — TELEPHONE ENCOUNTER
Reviewed patient chart and returned call to patient with the advice to not have a professional massage till after she sees Dr. Lala and discusses with him at her next appointment.n  She has chronic DVT in her groin area related to the DVT and she also continues to have swelling in that leg.  I explained that this can occur due to the chronic DVT.  She v/u.

## 2022-12-29 NOTE — TELEPHONE ENCOUNTER
Caller: Radha Cao    Relationship: Self    Best call back number: [phone]    What is the best time to reach you: ANYTIME    Who are you requesting to speak with (clinical staff, provider,  specific staff member): DR ORTIZ OR HIS NURSE         What was the call regarding:     WANTING TO KNOW REGARDING GETTING A PROFESSIONAL  MASSAGE     CONCERNING DVT'S  IF IS ABLE TO GET ONE OR NOT?    ALSO WANTED DR ORTIZ TO CHECK ON THE 2ND DOPPLER  ON THE SIZE OF THEM AS WELL    IS STILL HAVING UNCOMFORTABLENESS IN GROIN AREA ON RIGHT      IT IS NOT AS CONSTANT DOES COME AND GO    PLEASE CALL TO DISCUSS       Do you require a callback: YES

## 2023-01-17 ENCOUNTER — TELEPHONE (OUTPATIENT)
Dept: GASTROENTEROLOGY | Facility: CLINIC | Age: 53
End: 2023-01-17
Payer: COMMERCIAL

## 2023-01-17 NOTE — TELEPHONE ENCOUNTER
Caller: Radha Cao    Relationship: Self    Best call back number: [phone]    What is the best time to reach you:     Who are you requesting to speak with (clinical staff, provider,  specific staff member): DARRYL OR MINAL    What was the call regarding: PATIENT STATES THAT SHE HAS HAD LIVER ISSUES AND WANTS TO KNOW GOING FORWARD IF SHE NEEDS EGD OR ANOTHER SCOPE DONE REGARDING ON-GOING ISSUES.     PATIENT WOULD LIKE A CALL BACK TO DISCUSS THE NEXT STEPS MOVING FORWARD.     Do you require a callback: YES

## 2023-01-19 RX ORDER — FLUCONAZOLE 150 MG/1
TABLET ORAL
OUTPATIENT
Start: 2023-01-19

## 2023-01-19 NOTE — TELEPHONE ENCOUNTER
Pt was informed several months ago this would no longer be refilled-   She was finding different practice

## 2023-01-24 ENCOUNTER — HOSPITAL ENCOUNTER (OUTPATIENT)
Dept: CARDIOLOGY | Facility: HOSPITAL | Age: 53
Discharge: HOME | End: 2023-01-24

## 2023-01-24 RX ORDER — PROMETHAZINE HYDROCHLORIDE 25 MG/1
25 TABLET ORAL EVERY 6 HOURS PRN
OUTPATIENT
Start: 2023-01-24

## 2023-01-24 RX ORDER — FLUCONAZOLE 150 MG/1
TABLET ORAL
OUTPATIENT
Start: 2023-01-24

## 2023-01-25 ENCOUNTER — HOSPITAL ENCOUNTER (OUTPATIENT)
Dept: CARDIOLOGY | Facility: HOSPITAL | Age: 53
Discharge: HOME | End: 2023-01-25
Admitting: INTERNAL MEDICINE
Payer: COMMERCIAL

## 2023-01-25 DIAGNOSIS — Z79.01 ANTICOAGULATION ADEQUATE: ICD-10-CM

## 2023-01-25 DIAGNOSIS — I82.411 ACUTE DEEP VEIN THROMBOSIS (DVT) OF FEMORAL VEIN OF RIGHT LOWER EXTREMITY: ICD-10-CM

## 2023-01-25 DIAGNOSIS — I82.401 LEG DVT (DEEP VENOUS THROMBOEMBOLISM), ACUTE, RIGHT: ICD-10-CM

## 2023-01-25 LAB
BH CV LOW VAS RIGHT DISTAL FEMORAL SPONT: 1
BH CV LOW VAS RIGHT GASTRONEMIUS VESSEL: 1
BH CV LOWER VASCULAR LEFT COMMON FEMORAL AUGMENT: NORMAL
BH CV LOWER VASCULAR LEFT COMMON FEMORAL COMPETENT: NORMAL
BH CV LOWER VASCULAR LEFT COMMON FEMORAL COMPRESS: NORMAL
BH CV LOWER VASCULAR LEFT COMMON FEMORAL PHASIC: NORMAL
BH CV LOWER VASCULAR LEFT COMMON FEMORAL SPONT: NORMAL
BH CV LOWER VASCULAR RIGHT COMMON FEMORAL AUGMENT: NORMAL
BH CV LOWER VASCULAR RIGHT COMMON FEMORAL COMPETENT: NORMAL
BH CV LOWER VASCULAR RIGHT COMMON FEMORAL COMPRESS: NORMAL
BH CV LOWER VASCULAR RIGHT COMMON FEMORAL PHASIC: NORMAL
BH CV LOWER VASCULAR RIGHT COMMON FEMORAL SPONT: NORMAL
BH CV LOWER VASCULAR RIGHT DISTAL FEMORAL COMPRESS: NORMAL
BH CV LOWER VASCULAR RIGHT DISTAL FEMORAL THROMBUS: NORMAL
BH CV LOWER VASCULAR RIGHT GASTRONEMIUS COMPRESS: NORMAL
BH CV LOWER VASCULAR RIGHT GASTRONEMIUS THROMBUS: NORMAL
BH CV LOWER VASCULAR RIGHT GREATER SAPH AK COMPRESS: NORMAL
BH CV LOWER VASCULAR RIGHT GREATER SAPH BK COMPRESS: NORMAL
BH CV LOWER VASCULAR RIGHT LESSER SAPH COMPRESS: NORMAL
BH CV LOWER VASCULAR RIGHT MID FEMORAL AUGMENT: NORMAL
BH CV LOWER VASCULAR RIGHT MID FEMORAL COMPETENT: NORMAL
BH CV LOWER VASCULAR RIGHT MID FEMORAL COMPRESS: NORMAL
BH CV LOWER VASCULAR RIGHT MID FEMORAL PHASIC: NORMAL
BH CV LOWER VASCULAR RIGHT MID FEMORAL SPONT: NORMAL
BH CV LOWER VASCULAR RIGHT PERONEAL COMPRESS: NORMAL
BH CV LOWER VASCULAR RIGHT POPLITEAL AUGMENT: NORMAL
BH CV LOWER VASCULAR RIGHT POPLITEAL COMPETENT: NORMAL
BH CV LOWER VASCULAR RIGHT POPLITEAL COMPRESS: NORMAL
BH CV LOWER VASCULAR RIGHT POPLITEAL PHASIC: NORMAL
BH CV LOWER VASCULAR RIGHT POPLITEAL SPONT: NORMAL
BH CV LOWER VASCULAR RIGHT POPLITEAL THROMBUS: NORMAL
BH CV LOWER VASCULAR RIGHT POSTERIOR TIBIAL COMPRESS: NORMAL
BH CV LOWER VASCULAR RIGHT PROFUNDA FEMORAL COMPRESS: NORMAL
BH CV LOWER VASCULAR RIGHT PROXIMAL FEMORAL COMPRESS: NORMAL
BH CV LOWER VASCULAR RIGHT SAPHENOFEMORAL JUNCTION COMPRESS: NORMAL
MAXIMAL PREDICTED HEART RATE: 168 BPM
STRESS TARGET HR: 143 BPM

## 2023-01-25 PROCEDURE — 93971 EXTREMITY STUDY: CPT

## 2023-01-26 ENCOUNTER — TELEPHONE (OUTPATIENT)
Dept: ONCOLOGY | Facility: CLINIC | Age: 53
End: 2023-01-26

## 2023-01-26 NOTE — TELEPHONE ENCOUNTER
Caller: Radha Cao    Relationship: Self    Best call back number: [phone]    What is the best time to reach you: ANYTIME    Who are you requesting to speak with (clinical staff, provider, specific staff member): SCHEDULING    What was the call regarding: PT WOULD LIKE TO R/S HER 2/2 APPTS TO 2/9 IF POSSIBLE - SHE HAS ANOTHER APPT ON 2/9 AT 1015 AM. PLEASE CALL TO ADVISE.     Do you require a callback: YES

## 2023-02-02 ENCOUNTER — TELEPHONE (OUTPATIENT)
Dept: ONCOLOGY | Facility: CLINIC | Age: 53
End: 2023-02-02
Payer: COMMERCIAL

## 2023-02-02 NOTE — TELEPHONE ENCOUNTER
Currently adequately controlled  THIS PATIENT WAS CALLED WITH AN APPT WITH NP AT THE [facility] OFFICE AS DR ORTIZ IS AT [place] OFFICE AS PATIENT DID NOT WANT TO SEE DR ORTIZ AT THE [place] OFFICE - AND THAT THE PATIENT HAD TO HAVE THURSDAYS  DUE TO HER TRANSPORTATION - PER KATIE WE ADDED WITH NP

## 2023-02-02 NOTE — TELEPHONE ENCOUNTER
----- Message from Bernie Linares sent at 2/2/2023 10:42 AM EST -----  Regarding: cancelled  Patient cancelled via [facility], please call and re-schedule.  If you leave a voice mail, make sure to put a note in the chart, per Bina.

## 2023-02-09 ENCOUNTER — LAB (OUTPATIENT)
Dept: LAB | Facility: HOSPITAL | Age: 53
End: 2023-02-09
Payer: COMMERCIAL

## 2023-02-09 ENCOUNTER — OFFICE VISIT (OUTPATIENT)
Dept: GASTROENTEROLOGY | Facility: CLINIC | Age: 53
End: 2023-02-09
Payer: COMMERCIAL

## 2023-02-09 ENCOUNTER — OFFICE VISIT (OUTPATIENT)
Dept: ONCOLOGY | Facility: CLINIC | Age: 53
End: 2023-02-09
Payer: COMMERCIAL

## 2023-02-09 VITALS
TEMPERATURE: 97.3 F | WEIGHT: 187 LBS | HEIGHT: 64 IN | SYSTOLIC BLOOD PRESSURE: 134 MMHG | HEART RATE: 87 BPM | BODY MASS INDEX: 31.92 KG/M2 | RESPIRATION RATE: 18 BRPM | DIASTOLIC BLOOD PRESSURE: 82 MMHG | OXYGEN SATURATION: 97 %

## 2023-02-09 VITALS
DIASTOLIC BLOOD PRESSURE: 70 MMHG | WEIGHT: 187 LBS | SYSTOLIC BLOOD PRESSURE: 119 MMHG | HEART RATE: 90 BPM | HEIGHT: 64 IN | TEMPERATURE: 97.1 F | BODY MASS INDEX: 31.92 KG/M2 | OXYGEN SATURATION: 97 %

## 2023-02-09 DIAGNOSIS — I82.401 LEG DVT (DEEP VENOUS THROMBOEMBOLISM), ACUTE, RIGHT: ICD-10-CM

## 2023-02-09 DIAGNOSIS — K59.03 THERAPEUTIC OPIOID INDUCED CONSTIPATION: Primary | ICD-10-CM

## 2023-02-09 DIAGNOSIS — T40.2X5A THERAPEUTIC OPIOID INDUCED CONSTIPATION: Primary | ICD-10-CM

## 2023-02-09 DIAGNOSIS — I82.411 ACUTE DEEP VEIN THROMBOSIS (DVT) OF FEMORAL VEIN OF RIGHT LOWER EXTREMITY: ICD-10-CM

## 2023-02-09 DIAGNOSIS — R79.89 ELEVATED LIVER FUNCTION TESTS: ICD-10-CM

## 2023-02-09 DIAGNOSIS — Z86.718 HISTORY OF DVT (DEEP VEIN THROMBOSIS): ICD-10-CM

## 2023-02-09 DIAGNOSIS — Z79.01 ANTICOAGULATION ADEQUATE: ICD-10-CM

## 2023-02-09 DIAGNOSIS — K21.9 GASTROESOPHAGEAL REFLUX DISEASE, UNSPECIFIED WHETHER ESOPHAGITIS PRESENT: ICD-10-CM

## 2023-02-09 DIAGNOSIS — Z79.01 ANTICOAGULATION ADEQUATE: Primary | ICD-10-CM

## 2023-02-09 LAB
BASOPHILS # BLD AUTO: 0.04 10*3/MM3 (ref 0–0.2)
BASOPHILS NFR BLD AUTO: 0.6 % (ref 0–1.5)
DEPRECATED RDW RBC AUTO: 46 FL (ref 37–54)
EOSINOPHIL # BLD AUTO: 0.15 10*3/MM3 (ref 0–0.4)
EOSINOPHIL NFR BLD AUTO: 2.1 % (ref 0.3–6.2)
ERYTHROCYTE [DISTWIDTH] IN BLOOD BY AUTOMATED COUNT: 13.4 % (ref 12.3–15.4)
HCT VFR BLD AUTO: 36.9 % (ref 34–46.6)
HGB BLD-MCNC: 11.8 G/DL (ref 12–15.9)
IMM GRANULOCYTES # BLD AUTO: 0.04 10*3/MM3 (ref 0–0.05)
IMM GRANULOCYTES NFR BLD AUTO: 0.6 % (ref 0–0.5)
LYMPHOCYTES # BLD AUTO: 2.81 10*3/MM3 (ref 0.7–3.1)
LYMPHOCYTES NFR BLD AUTO: 39.2 % (ref 19.6–45.3)
MCH RBC QN AUTO: 30.1 PG (ref 26.6–33)
MCHC RBC AUTO-ENTMCNC: 32 G/DL (ref 31.5–35.7)
MCV RBC AUTO: 94.1 FL (ref 79–97)
MONOCYTES # BLD AUTO: 0.69 10*3/MM3 (ref 0.1–0.9)
MONOCYTES NFR BLD AUTO: 9.6 % (ref 5–12)
NEUTROPHILS NFR BLD AUTO: 3.44 10*3/MM3 (ref 1.7–7)
NEUTROPHILS NFR BLD AUTO: 47.9 % (ref 42.7–76)
NRBC BLD AUTO-RTO: 0 /100 WBC (ref 0–0.2)
PLATELET # BLD AUTO: 311 10*3/MM3 (ref 140–450)
PMV BLD AUTO: 9.6 FL (ref 6–12)
RBC # BLD AUTO: 3.92 10*6/MM3 (ref 3.77–5.28)
WBC NRBC COR # BLD: 7.17 10*3/MM3 (ref 3.4–10.8)

## 2023-02-09 PROCEDURE — 99214 OFFICE O/P EST MOD 30 MIN: CPT | Performed by: NURSE PRACTITIONER

## 2023-02-09 PROCEDURE — 85025 COMPLETE CBC W/AUTO DIFF WBC: CPT

## 2023-02-09 PROCEDURE — 36415 COLL VENOUS BLD VENIPUNCTURE: CPT

## 2023-02-09 RX ORDER — NALDEMEDINE 0.2 MG/1
1 TABLET ORAL DAILY
Qty: 90 TABLET | Refills: 3 | Status: SHIPPED | OUTPATIENT
Start: 2023-02-09

## 2023-02-09 RX ORDER — HYDROXYZINE HYDROCHLORIDE 10 MG/1
TABLET, FILM COATED ORAL
COMMUNITY
Start: 2023-01-03

## 2023-02-09 RX ORDER — APIXABAN 5 MG/1
5 TABLET, FILM COATED ORAL EVERY 12 HOURS
Qty: 60 TABLET | Refills: 5 | Status: SHIPPED | OUTPATIENT
Start: 2023-02-09
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
## 2023-02-10 LAB
ALBUMIN SERPL-MCNC: 4.3 G/DL (ref 3.5–5.2)
ALBUMIN/GLOB SERPL: 2 G/DL
ALP SERPL-CCNC: 79 U/L (ref 39–117)
ALT SERPL-CCNC: 21 U/L (ref 1–33)
AST SERPL-CCNC: 30 U/L (ref 1–32)
BASOPHILS # BLD AUTO: 0.03 10*3/MM3 (ref 0–0.2)
BASOPHILS NFR BLD AUTO: 0.5 % (ref 0–1.5)
BILIRUB SERPL-MCNC: 0.3 MG/DL (ref 0–1.2)
BUN SERPL-MCNC: 9 MG/DL (ref 6–20)
BUN/CREAT SERPL: 12.9 (ref 7–25)
CALCIUM SERPL-MCNC: 9.3 MG/DL (ref 8.6–10.5)
CHLORIDE SERPL-SCNC: 101 MMOL/L (ref 98–107)
CO2 SERPL-SCNC: 29.9 MMOL/L (ref 22–29)
CREAT SERPL-MCNC: 0.7 MG/DL (ref 0.57–1)
EGFRCR SERPLBLD CKD-EPI 2021: 104.2 ML/MIN/1.73
EOSINOPHIL # BLD AUTO: 0.15 10*3/MM3 (ref 0–0.4)
EOSINOPHIL NFR BLD AUTO: 2.3 % (ref 0.3–6.2)
ERYTHROCYTE [DISTWIDTH] IN BLOOD BY AUTOMATED COUNT: 13 % (ref 12.3–15.4)
GLOBULIN SER CALC-MCNC: 2.2 GM/DL
GLUCOSE SERPL-MCNC: 83 MG/DL (ref 65–99)
HCT VFR BLD AUTO: 36.6 % (ref 34–46.6)
HGB BLD-MCNC: 11.8 G/DL (ref 12–15.9)
IMM GRANULOCYTES # BLD AUTO: 0.01 10*3/MM3 (ref 0–0.05)
IMM GRANULOCYTES NFR BLD AUTO: 0.2 % (ref 0–0.5)
INR PPP: 1.03 (ref 0.9–1.1)
LYMPHOCYTES # BLD AUTO: 2.67 10*3/MM3 (ref 0.7–3.1)
LYMPHOCYTES NFR BLD AUTO: 41.3 % (ref 19.6–45.3)
MCH RBC QN AUTO: 30.6 PG (ref 26.6–33)
MCHC RBC AUTO-ENTMCNC: 32.2 G/DL (ref 31.5–35.7)
MCV RBC AUTO: 94.8 FL (ref 79–97)
MONOCYTES # BLD AUTO: 0.56 10*3/MM3 (ref 0.1–0.9)
MONOCYTES NFR BLD AUTO: 8.7 % (ref 5–12)
NEUTROPHILS # BLD AUTO: 3.04 10*3/MM3 (ref 1.7–7)
NEUTROPHILS NFR BLD AUTO: 47 % (ref 42.7–76)
NRBC BLD AUTO-RTO: 0 /100 WBC (ref 0–0.2)
PLATELET # BLD AUTO: 326 10*3/MM3 (ref 140–450)
POTASSIUM SERPL-SCNC: 4.4 MMOL/L (ref 3.5–5.2)
PROT SERPL-MCNC: 6.5 G/DL (ref 6–8.5)
PROTHROMBIN TIME: 13.6 SECONDS (ref 11.7–14.2)
RBC # BLD AUTO: 3.86 10*6/MM3 (ref 3.77–5.28)
SODIUM SERPL-SCNC: 139 MMOL/L (ref 136–145)
WBC # BLD AUTO: 6.46 10*3/MM3 (ref 3.4–10.8)

## 2023-02-10 NOTE — PROGRESS NOTES
Please inform the patient that her liver function tests are normal nothing worrisome on labs.  Await liver ultrasound.

## 2023-02-14 ENCOUNTER — TELEPHONE (OUTPATIENT)
Dept: GASTROENTEROLOGY | Facility: CLINIC | Age: 53
End: 2023-02-14
Payer: COMMERCIAL

## 2023-02-14 NOTE — TELEPHONE ENCOUNTER
PA approved  approved. The authorization is effective for a maximum of 12 fills from 02/14/2023 to 02/13/2024    Pharmacy notified.

## 2023-02-23 ENCOUNTER — TELEPHONE (OUTPATIENT)
Dept: GASTROENTEROLOGY | Facility: CLINIC | Age: 53
End: 2023-02-23

## 2023-02-23 NOTE — TELEPHONE ENCOUNTER
Caller: Radha Cao    Relationship to patient: Self    Best call back number: [phone]    Patient is needing: [facility] NEEDS REFERRAL FROM [facility] TO SCHEDULE PATIENT.

## 2023-03-02 ENCOUNTER — TELEPHONE (OUTPATIENT)
Dept: GASTROENTEROLOGY | Facility: CLINIC | Age: 53
End: 2023-03-02

## 2023-03-02 NOTE — TELEPHONE ENCOUNTER
Faxed US order to [phone], confirmation rec'd.       Called pt and left vm and advised the order has been faxed to [facility] and they will be calling her to schedule.

## 2023-03-02 NOTE — TELEPHONE ENCOUNTER
Caller: Radha Cao    Relationship: Self    Best call back number: [phone]    What orders are you requesting (i.e. lab or imaging): IMAGING US LIVER    In what timeframe would the patient need to come in: ASAP    Where will you receive your lab/imaging services: [facility] FAX:[phone] PH:[phone]    Additional notes: FROM PREVIOUS NOTE LOOKS LIKE OFFICE SENT ORDERS ON 2/24/23 HOWEVER PT CALLED TO STATE THAT SHE SPOKE WITH THEM THIS MORNING AND THEY DID NOT HAVE ORDERS. PT REQUESTS THAT THEY BE SENT AGAIN. PT STATED PROVIDER AT [facility] REQUESTS THAT OFFICE CALL AFTER SENDING FAX TO CONFIRM.

## 2023-03-09 ENCOUNTER — TELEPHONE (OUTPATIENT)
Dept: ONCOLOGY | Facility: CLINIC | Age: 53
End: 2023-03-09
Payer: COMMERCIAL
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
## 2023-03-10 RX ORDER — CARISOPRODOL 350 MG/1
350 TABLET ORAL
OUTPATIENT
Start: 2023-03-10

## 2023-03-10 RX ORDER — CARISOPRODOL 350 MG/1
350 TABLET ORAL
Status: CANCELLED | OUTPATIENT
Start: 2023-03-10

## 2023-03-10 RX ORDER — LORAZEPAM 1 MG/1
TABLET ORAL
OUTPATIENT
Start: 2023-03-10

## 2023-03-27 ENCOUNTER — TELEPHONE (OUTPATIENT)
Dept: GASTROENTEROLOGY | Facility: CLINIC | Age: 53
End: 2023-03-27

## 2023-03-27 NOTE — TELEPHONE ENCOUNTER
"  Hub staff attempted to follow warm transfer process and was unsuccessful     Caller: PT    Relationship to patient:     Best call back number: [phone]    Patient is needing: PT NEEDS CALL BACK TO VERIFY \"METABOLIC DISEASE\" SHE SAW ON MYCHART.             "

## 2023-04-04 ENCOUNTER — TELEPHONE (OUTPATIENT)
Dept: ONCOLOGY | Facility: CLINIC | Age: 53
End: 2023-04-04
Payer: COMMERCIAL
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
## 2023-04-04 NOTE — TELEPHONE ENCOUNTER
Patient says she cannot get anything done because of her blood clots.  She wants to know what to do about the inflammation and swelling to her body and pain.

## 2023-04-07 ENCOUNTER — TELEPHONE (OUTPATIENT)
Dept: ONCOLOGY | Facility: CLINIC | Age: 53
End: 2023-04-07
Payer: COMMERCIAL

## 2023-04-07 NOTE — TELEPHONE ENCOUNTER
Patient calling requesting Prednisone again. Per Dr Lala no prescription for Prednisone can be ordered at this time

## 2023-04-11 ENCOUNTER — HOSPITAL ENCOUNTER (OUTPATIENT)
Dept: CARDIOLOGY | Facility: HOSPITAL | Age: 53
Discharge: HOME | End: 2023-04-11
Payer: COMMERCIAL

## 2023-04-11 ENCOUNTER — TELEPHONE (OUTPATIENT)
Dept: ONCOLOGY | Facility: CLINIC | Age: 53
End: 2023-04-11
Payer: COMMERCIAL

## 2023-04-11 DIAGNOSIS — M79.603 PAIN AND SWELLING OF UPPER EXTREMITY, UNSPECIFIED LATERALITY: ICD-10-CM

## 2023-04-11 DIAGNOSIS — M79.89 PAIN AND SWELLING OF UPPER EXTREMITY, UNSPECIFIED LATERALITY: ICD-10-CM

## 2023-04-11 DIAGNOSIS — Z79.01 ANTICOAGULATION ADEQUATE: ICD-10-CM

## 2023-04-11 DIAGNOSIS — M79.89 PAIN AND SWELLING OF LOWER EXTREMITY, UNSPECIFIED LATERALITY: ICD-10-CM

## 2023-04-11 DIAGNOSIS — M79.606 PAIN AND SWELLING OF LOWER EXTREMITY, UNSPECIFIED LATERALITY: ICD-10-CM

## 2023-04-11 DIAGNOSIS — Z86.718 HISTORY OF DVT (DEEP VEIN THROMBOSIS): ICD-10-CM

## 2023-04-11 DIAGNOSIS — Z79.01 ANTICOAGULATION ADEQUATE: Primary | ICD-10-CM

## 2023-04-11 DIAGNOSIS — I82.411 ACUTE DEEP VEIN THROMBOSIS (DVT) OF FEMORAL VEIN OF RIGHT LOWER EXTREMITY: ICD-10-CM

## 2023-04-11 PROCEDURE — 93970 EXTREMITY STUDY: CPT
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
## 2023-04-12 ENCOUNTER — TELEPHONE (OUTPATIENT)
Dept: ONCOLOGY | Facility: CLINIC | Age: 53
End: 2023-04-12
Payer: COMMERCIAL

## 2023-04-12 LAB
BH CV LOW VAS RIGHT GASTRONEMIUS VESSEL: 1
BH CV LOW VAS RIGHT POPLITEAL SPONT: 1
BH CV LOWER VASCULAR LEFT COMMON FEMORAL AUGMENT: NORMAL
BH CV LOWER VASCULAR LEFT COMMON FEMORAL COMPETENT: NORMAL
BH CV LOWER VASCULAR LEFT COMMON FEMORAL COMPRESS: NORMAL
BH CV LOWER VASCULAR LEFT COMMON FEMORAL PHASIC: NORMAL
BH CV LOWER VASCULAR LEFT COMMON FEMORAL SPONT: NORMAL
BH CV LOWER VASCULAR LEFT GASTRONEMIUS COMPRESS: NORMAL
BH CV LOWER VASCULAR LEFT GREATER SAPH AK COMPRESS: NORMAL
BH CV LOWER VASCULAR LEFT GREATER SAPH BK COMPRESS: NORMAL
BH CV LOWER VASCULAR LEFT LESSER SAPH COMPRESS: NORMAL
BH CV LOWER VASCULAR LEFT MID FEMORAL AUGMENT: NORMAL
BH CV LOWER VASCULAR LEFT MID FEMORAL COMPETENT: NORMAL
BH CV LOWER VASCULAR LEFT MID FEMORAL COMPRESS: NORMAL
BH CV LOWER VASCULAR LEFT MID FEMORAL PHASIC: NORMAL
BH CV LOWER VASCULAR LEFT MID FEMORAL SPONT: NORMAL
BH CV LOWER VASCULAR LEFT POPLITEAL AUGMENT: NORMAL
BH CV LOWER VASCULAR LEFT POPLITEAL COMPETENT: NORMAL
BH CV LOWER VASCULAR LEFT POPLITEAL COMPRESS: NORMAL
BH CV LOWER VASCULAR LEFT POPLITEAL PHASIC: NORMAL
BH CV LOWER VASCULAR LEFT POPLITEAL SPONT: NORMAL
BH CV LOWER VASCULAR LEFT POSTERIOR TIBIAL COMPRESS: NORMAL
BH CV LOWER VASCULAR LEFT PROFUNDA FEMORAL COMPRESS: NORMAL
BH CV LOWER VASCULAR LEFT PROXIMAL FEMORAL COMPRESS: NORMAL
BH CV LOWER VASCULAR LEFT SAPHENOFEMORAL JUNCTION COMPRESS: NORMAL
BH CV LOWER VASCULAR RIGHT COMMON FEMORAL AUGMENT: NORMAL
BH CV LOWER VASCULAR RIGHT COMMON FEMORAL COMPETENT: NORMAL
BH CV LOWER VASCULAR RIGHT COMMON FEMORAL COMPRESS: NORMAL
BH CV LOWER VASCULAR RIGHT COMMON FEMORAL PHASIC: NORMAL
BH CV LOWER VASCULAR RIGHT COMMON FEMORAL SPONT: NORMAL
BH CV LOWER VASCULAR RIGHT DISTAL FEMORAL COMPRESS: NORMAL
BH CV LOWER VASCULAR RIGHT GASTRONEMIUS COMPRESS: NORMAL
BH CV LOWER VASCULAR RIGHT GASTRONEMIUS THROMBUS: NORMAL
BH CV LOWER VASCULAR RIGHT GREATER SAPH AK COMPRESS: NORMAL
BH CV LOWER VASCULAR RIGHT GREATER SAPH BK COMPRESS: NORMAL
BH CV LOWER VASCULAR RIGHT LESSER SAPH COMPRESS: NORMAL
BH CV LOWER VASCULAR RIGHT MID FEMORAL AUGMENT: NORMAL
BH CV LOWER VASCULAR RIGHT MID FEMORAL COMPETENT: NORMAL
BH CV LOWER VASCULAR RIGHT MID FEMORAL COMPRESS: NORMAL
BH CV LOWER VASCULAR RIGHT MID FEMORAL PHASIC: NORMAL
BH CV LOWER VASCULAR RIGHT MID FEMORAL SPONT: NORMAL
BH CV LOWER VASCULAR RIGHT POPLITEAL AUGMENT: NORMAL
BH CV LOWER VASCULAR RIGHT POPLITEAL COMPRESS: NORMAL
BH CV LOWER VASCULAR RIGHT POPLITEAL PHASIC: NORMAL
BH CV LOWER VASCULAR RIGHT POPLITEAL SPONT: NORMAL
BH CV LOWER VASCULAR RIGHT POPLITEAL THROMBUS: NORMAL
BH CV LOWER VASCULAR RIGHT POSTERIOR TIBIAL COMPRESS: NORMAL
BH CV LOWER VASCULAR RIGHT PROFUNDA FEMORAL COMPRESS: NORMAL
BH CV LOWER VASCULAR RIGHT PROXIMAL FEMORAL COMPRESS: NORMAL
BH CV LOWER VASCULAR RIGHT SAPHENOFEMORAL JUNCTION COMPRESS: NORMAL
BH CV UPPER VENOUS LEFT AXILLARY AUGMENT: NORMAL
BH CV UPPER VENOUS LEFT AXILLARY COMPRESS: NORMAL
BH CV UPPER VENOUS LEFT AXILLARY PHASIC: NORMAL
BH CV UPPER VENOUS LEFT AXILLARY SPONT: NORMAL
BH CV UPPER VENOUS LEFT BASILIC FOREARM COMPRESS: NORMAL
BH CV UPPER VENOUS LEFT BASILIC UPPER COMPRESS: NORMAL
BH CV UPPER VENOUS LEFT BRACHIAL COMPRESS: NORMAL
BH CV UPPER VENOUS LEFT CEPHALIC FOREARM COMPRESS: NORMAL
BH CV UPPER VENOUS LEFT CEPHALIC UPPER COMPRESS: NORMAL
BH CV UPPER VENOUS LEFT INTERNAL JUGULAR AUGMENT: NORMAL
BH CV UPPER VENOUS LEFT INTERNAL JUGULAR COMPRESS: NORMAL
BH CV UPPER VENOUS LEFT INTERNAL JUGULAR PHASIC: NORMAL
BH CV UPPER VENOUS LEFT INTERNAL JUGULAR SPONT: NORMAL
BH CV UPPER VENOUS LEFT RADIAL COMPRESS: NORMAL
BH CV UPPER VENOUS LEFT SUBCLAVIAN AUGMENT: NORMAL
BH CV UPPER VENOUS LEFT SUBCLAVIAN COMPRESS: NORMAL
BH CV UPPER VENOUS LEFT SUBCLAVIAN PHASIC: NORMAL
BH CV UPPER VENOUS LEFT SUBCLAVIAN SPONT: NORMAL
BH CV UPPER VENOUS LEFT ULNAR COMPRESS: NORMAL
BH CV UPPER VENOUS RIGHT AXILLARY AUGMENT: NORMAL
BH CV UPPER VENOUS RIGHT AXILLARY COMPRESS: NORMAL
BH CV UPPER VENOUS RIGHT AXILLARY PHASIC: NORMAL
BH CV UPPER VENOUS RIGHT AXILLARY SPONT: NORMAL
BH CV UPPER VENOUS RIGHT BASILIC FOREARM COMPRESS: NORMAL
BH CV UPPER VENOUS RIGHT BASILIC UPPER COMPRESS: NORMAL
BH CV UPPER VENOUS RIGHT BRACHIAL COMPRESS: NORMAL
BH CV UPPER VENOUS RIGHT CEPHALIC FOREARM COMPRESS: NORMAL
BH CV UPPER VENOUS RIGHT CEPHALIC UPPER COMPRESS: NORMAL
BH CV UPPER VENOUS RIGHT INTERNAL JUGULAR AUGMENT: NORMAL
BH CV UPPER VENOUS RIGHT INTERNAL JUGULAR COMPRESS: NORMAL
BH CV UPPER VENOUS RIGHT INTERNAL JUGULAR PHASIC: NORMAL
BH CV UPPER VENOUS RIGHT INTERNAL JUGULAR SPONT: NORMAL
BH CV UPPER VENOUS RIGHT RADIAL COMPRESS: NORMAL
BH CV UPPER VENOUS RIGHT SUBCLAVIAN AUGMENT: NORMAL
BH CV UPPER VENOUS RIGHT SUBCLAVIAN COMPRESS: NORMAL
BH CV UPPER VENOUS RIGHT SUBCLAVIAN PHASIC: NORMAL
BH CV UPPER VENOUS RIGHT SUBCLAVIAN SPONT: NORMAL
BH CV UPPER VENOUS RIGHT ULNAR COMPRESS: NORMAL
MAXIMAL PREDICTED HEART RATE: 167 BPM
MAXIMAL PREDICTED HEART RATE: 167 BPM
STRESS TARGET HR: 142 BPM
STRESS TARGET HR: 142 BPM

## 2023-04-12 NOTE — TELEPHONE ENCOUNTER
Attempted to call no answer no voice mail   Per Dr Lala patient has no new DVTs. DVT in right lower extremity chronic

## 2023-04-13 ENCOUNTER — TELEPHONE (OUTPATIENT)
Dept: ONCOLOGY | Facility: CLINIC | Age: 53
End: 2023-04-13
Payer: COMMERCIAL

## 2023-04-14 ENCOUNTER — TELEPHONE (OUTPATIENT)
Dept: ONCOLOGY | Facility: CLINIC | Age: 53
End: 2023-04-14
Payer: COMMERCIAL

## 2023-04-14 NOTE — TELEPHONE ENCOUNTER
Patient called wanting Dr Lala to prescribe Prednisone. Per Dr Lala he will not prescribe Prednisone for patient due to he only follows patient for her right lower extremity DVTs. Patient had STAT Dopplers on both legs and upper body on 4/11/2023. Patient has no new DVTs and the DVT in right leg is chronic.    Informed patient if she is having unbearable pain, inflammation and lymphedema and her Orthopedic doctor will not follow up with her or prescribe medication she may need to go to ER to be checked out.

## 2023-04-17 ENCOUNTER — TELEPHONE (OUTPATIENT)
Dept: FAMILY MEDICINE CLINIC | Facility: CLINIC | Age: 53
End: 2023-04-17
Payer: COMMERCIAL

## 2023-04-17 NOTE — TELEPHONE ENCOUNTER
Caller: Radha Cao    Relationship to patient: Self    Best call back number: [phone]    Chief complaint: LYMPHEDEMA, PAST HISTORY OF BLOOD CLOTS    Type of visit: NEW PATIENT WITH DR. FULLER    Requested date: ASAP    If rescheduling, when is the original appointment: N/A - THE PATIENT IS NOT HAPPY WITH THE CARE SHE IS RECEIVING WITH HER CURRENT PROVIDER.

## 2023-04-20 ENCOUNTER — TELEPHONE (OUTPATIENT)
Dept: GASTROENTEROLOGY | Facility: CLINIC | Age: 53
End: 2023-04-20
Payer: COMMERCIAL

## 2023-04-20 NOTE — TELEPHONE ENCOUNTER
"Called pt and she is asking for clarification on some things from her recent ultrasounds    - she reports on her US from April of 2022 saw \"several cystis in the liver\" on the most recent scan only 1 was seen.  She is asking what ab the others?    - she would also like further clarification on what the following means; diffusely coarsened echotexture throughout with mildly nodular surface appearance    "

## 2023-04-20 NOTE — TELEPHONE ENCOUNTER
----- Message from SUMMER Pederson sent at 4/17/2023 10:59 AM EDT -----  Regarding: FW: Liver  Contact: [phone]  I am having a difficult time following this message can we call her to clarify questions?      ----- Message -----  From: Brandie Rea RN  Sent: 4/13/2023   8:19 AM EDT  To: SUMMER Pederson  Subject: FW: Liver                                          ----- Message -----  From: Radha Cao  Sent: 4/12/2023   9:48 PM EDT  To: [facility]  Subject: Liver                                            Hey there Gricelda this is Veto Cao so from my liver abdomen ultrasound I need I need to finish asking some I need some answers to help me understand a couple of things the echo texture of the liver is you know gray I know that all the balls that were in there she says there's only one I just would you please call me to your earliest convenience in the morning [phone] appreciate you

## 2023-04-24 NOTE — TELEPHONE ENCOUNTER
Looks like ultrasound was done at [facility] and results were not forwarded to me for review.  There is concerning findings for possible cirrhosis.  We could get a better look with a triple phase CT of the liver if she is agreeable.

## 2023-05-03 ENCOUNTER — TELEPHONE (OUTPATIENT)
Dept: GASTROENTEROLOGY | Facility: CLINIC | Age: 53
End: 2023-05-03

## 2023-05-03 NOTE — TELEPHONE ENCOUNTER
Provider: SUMMER LOONEY    Caller: SARA ANN    Relationship to Patient: SELF    Phone Number: [phone]    Reason for Call: PT STATES PROVIDER MUST CALL [facility] TO GIVE APPROVAL FOR CT SCAN. CONTACT # [phone]    CONTACT PT IF ANY QUESTIONS

## 2023-05-08 ENCOUNTER — TELEPHONE (OUTPATIENT)
Dept: GASTROENTEROLOGY | Facility: CLINIC | Age: 53
End: 2023-05-08
Payer: COMMERCIAL

## 2023-05-08 NOTE — TELEPHONE ENCOUNTER
----- Message from Radha Cao sent at 5/8/2023 10:47 AM EDT -----  Regarding: CT SCAN  Contact: [phone]  Friday at  11.40 at [facility] in [place] I believe it's the 13th

## 2023-05-19 ENCOUNTER — TELEPHONE (OUTPATIENT)
Dept: GASTROENTEROLOGY | Facility: CLINIC | Age: 53
End: 2023-05-19
Payer: COMMERCIAL

## 2023-05-22 NOTE — TELEPHONE ENCOUNTER
Called patient. Left vm to call back to     Tried to call and get imaging results from [facility]. Was on hold for over 5 minutes will try to call again.

## 2023-05-23 ENCOUNTER — TELEPHONE (OUTPATIENT)
Dept: GASTROENTEROLOGY | Facility: CLINIC | Age: 53
End: 2023-05-23
Payer: COMMERCIAL

## 2023-05-23 NOTE — TELEPHONE ENCOUNTER
CALLER: Radha Cao    RELATIONSHIP TO PATIENT: Self    BEST CALL BACK NUMBER:[phone]    CALL REGARDING:Go over liver scan results    Patient Request a Call Back

## 2023-05-24 ENCOUNTER — TELEPHONE (OUTPATIENT)
Dept: GASTROENTEROLOGY | Facility: CLINIC | Age: 53
End: 2023-05-24
Payer: COMMERCIAL

## 2023-05-24 NOTE — TELEPHONE ENCOUNTER
Caller: Radha Cao    Relationship to patient: Self    Best call back number: [phone]    Patient is needing: TO HAVE GOWIN APRN OR CLINICAL TO CALL AND GO OVER LIVER LAB RESULTS. PATIENT STATES GEORGE LIKE TO KNOW THE RESULTS AND NEXT STEPS    PLEASE REVIEW AND CONTACT PATIENT - THANK YOU

## 2023-05-25 NOTE — TELEPHONE ENCOUNTER
Caller: Radha Cao    Relationship: Self    Best call back number: [phone] (Mobile)    What is the best time to reach you: ANYTIME    Who are you requesting to speak with (clinical staff, provider,  specific staff member): CLINICAL    Do you know the name of the person who called: SUSHIL    What was the call regarding: *RESULTS    Do you require a callback: ASAP

## 2023-05-30 ENCOUNTER — TELEPHONE (OUTPATIENT)
Dept: GASTROENTEROLOGY | Facility: CLINIC | Age: 53
End: 2023-05-30

## 2023-05-30 NOTE — TELEPHONE ENCOUNTER
Caller: Radha Cao    Relationship to patient: Self    Best call back number: [phone]    Patient is needing: TO HAVE GOWIN APRN OR CLINICAL TO CALL AND GO OVER LIVER LAB RESULTS AS WELL AS THE CT / ULTRASOUND RESULTS AS WELL. PATIENT STATES GEORGE LIKE TO KNOW THE RESULTS AND NEXT STEPS     PLEASE REVIEW AND CONTACT PATIENT - OK TO [facility].

## 2023-05-31 ENCOUNTER — TELEPHONE (OUTPATIENT)
Dept: GASTROENTEROLOGY | Facility: CLINIC | Age: 53
End: 2023-05-31

## 2023-05-31 NOTE — TELEPHONE ENCOUNTER
Call to patient and left vm informing her that due to Card's systems still being down we are not able to get the patients CT results. [facility] fax is down and care everywhere is not updating for [facility]. Asked patient to give us a couple of weeks to try and get it as [facility] continues to try and get all their systems up and running.

## 2023-05-31 NOTE — TELEPHONE ENCOUNTER
Call to patient to clarify where CT scan was done. Patient states CT was done at [facility]. Called and CT tech stated she would try to fax them to [phone].

## 2023-06-08 ENCOUNTER — TELEPHONE (OUTPATIENT)
Dept: GASTROENTEROLOGY | Facility: CLINIC | Age: 53
End: 2023-06-08
Payer: COMMERCIAL

## 2023-06-08 NOTE — TELEPHONE ENCOUNTER
"Returned patient's phone call. Advised due to the recent cyber attack on [employer] software, the radiologists are behind reading the X-ray results.   Advised patient called [email or web address] and was told a \"wet reading\" will be faxed to the office.     Advised still awaiting for the fax.     Patient states she will go to [email or web address] to  the hard drive copy of her scan and bring it to the office to be read.     Advised she may do this M-F between 8a-4p.     She verb understanding.   "

## 2023-06-08 NOTE — TELEPHONE ENCOUNTER
Caller: Radha Cao    Relationship: Self    Best call back number: [phone]    What is the best time to reach you: ANYTIME    Who are you requesting to speak with (clinical staff, provider,  specific staff member): CLINICAL STAFF    What was the call regarding: PT REQUESTS FEEDBACK CONCERNING HER LAST CT SCAN/HOW TO PROCEED  WHEN WOULD YOU BE ABLE TO SEE HER? SHE CAN  RESULTS AND BRING THEM TO YOU    Is it okay if the provider responds through [employer]: YES

## 2023-07-25 ENCOUNTER — TELEPHONE (OUTPATIENT)
Dept: GASTROENTEROLOGY | Facility: CLINIC | Age: 53
End: 2023-07-25
Payer: COMMERCIAL

## 2023-07-25 DIAGNOSIS — K76.89 LIVER CYST: Primary | ICD-10-CM

## 2023-07-25 NOTE — TELEPHONE ENCOUNTER
Called patient per Dr. Pina's results and recommendations. Patient asked for the order to be faxed to [facility] when it is signed.     MRI order placed and send to provider to sign

## 2023-07-25 NOTE — TELEPHONE ENCOUNTER
Hub staff attempted to follow warm transfer process and was unsuccessful     Caller: Radha Cao    Relationship to patient: Self    Best call back number: [phone]     Patient is needing: PT IS RETURNING CLINICAL CALL. AVAILABLE ALL DAY.

## 2023-09-07 ENCOUNTER — TELEPHONE (OUTPATIENT)
Dept: GASTROENTEROLOGY | Facility: CLINIC | Age: 53
End: 2023-09-07
Payer: COMMERCIAL

## 2023-09-07 NOTE — TELEPHONE ENCOUNTER
CALLER: Joyce from [facility] with Darlene Eduardo    RELATIONSHIP TO PATIENT:    BEST CALL BACK NUMBER: [phone]    CALL REGARDING: Needs PA for MRI for Abdomin Pelvis With and With Out Contrast and appointment is schedule for tomorrow 9/8/2023     Ms. Cook informed that Ms. Cao called to schedule appointment and informed that she has the order and need authorization  before 4pm today or will have to cancel tomorrow appointment.     Ms. Cook leaves today 9/7/2023 at 2pm and informed that you can call back and ask for Ms. Butcher and she will be able to assist    Request a Call Back

## 2023-09-08 ENCOUNTER — TELEPHONE (OUTPATIENT)
Dept: GASTROENTEROLOGY | Facility: CLINIC | Age: 53
End: 2023-09-08
Payer: COMMERCIAL

## 2023-09-08 NOTE — TELEPHONE ENCOUNTER
Hub staff attempted to follow warm transfer process and was unsuccessful     Caller: Radha Cao    Relationship to patient: Self    Best call back number: [phone]    Patient is needing: PATIENT CONTACTING TO SPEAK WITH SOMEONE IN REGARDS TO PRIOR AUTHORIZATION FOR MRI AS WELL AS GIVE NEW APPOINTMENT DATE. WOULD REALLY LIKE FOR SOMEONE TO REACH OUT ASAP.  THANK YOU

## 2023-09-08 NOTE — TELEPHONE ENCOUNTER
Hub staff attempted to follow warm transfer process and was unsuccessful      Caller: Radha Cao     Relationship to patient: Self     Best call back number: [phone]     Patient is needing: PATIENT CONTACTING TO SPEAK WITH SOMEONE IN REGARDS TO PRIOR AUTHORIZATION FOR MRI AS WELL AS GIVE NEW APPOINTMENT DATE. WOULD REALLY LIKE FOR SOMEONE TO REACH OUT ASAP.  THANK YOU

## 2023-09-20 RX ORDER — APIXABAN 5 MG/1
TABLET, FILM COATED ORAL
Qty: 60 TABLET | Refills: 5 | OUTPATIENT
Start: 2023-09-20

## 2023-09-25 NOTE — TELEPHONE ENCOUNTER
Hub staff attempted to follow warm transfer process and was unsuccessful     Caller: KELSY    Relationship to patient: Other    Best call back number: [phone]    Patient is needing: [facility] IS NEEDING A PRIO AUTH FOR AN MRI BY 09/27/23 BEFORE 2PM. PLEASE SEND PRIOR AUTH.

## 2023-09-27 ENCOUNTER — TELEPHONE (OUTPATIENT)
Dept: GASTROENTEROLOGY | Facility: CLINIC | Age: 53
End: 2023-09-27
Payer: COMMERCIAL

## 2023-09-27 NOTE — TELEPHONE ENCOUNTER
Caller: [facility]    Relationship to patient: Provider    Best call back number:   [phone]    Patient is needing: [facility] CALLED STATING PATIENT NEEDS TO HAVE MRI RESCHEDULED DUE TO NO AUTHORIZATION ON FILE.

## 2023-10-09 NOTE — TELEPHONE ENCOUNTER
Hub staff attempted to follow warm transfer process and was unsuccessful     Caller: Radha Cao    Relationship to patient: Self    Best call back number: [phone]    Patient is needing: PATIENT CALLED IN AND FOR WEEKS SHE HAS BEEN TRYING TO GET HER MRI DONE AND THE AUTHORIZATION FOR THE MRI HASN'T GOTTEN DONE. SHE'S HAD TO CANCEL 2 THE DAY OF BECAUSE OF THE AUTH NOT BEING TAKEN CARE OF. SHE WOULD LIKE A CALL ASAP PLEASE AND YOU CAN LEAVE HER A VMAIL.

## 2023-10-12 ENCOUNTER — TELEPHONE (OUTPATIENT)
Dept: ONCOLOGY | Facility: CLINIC | Age: 53
End: 2023-10-12
Payer: COMMERCIAL

## 2023-10-12 DIAGNOSIS — Z79.01 ANTICOAGULATION ADEQUATE: Primary | ICD-10-CM

## 2023-10-12 DIAGNOSIS — Z86.718 HISTORY OF DVT (DEEP VEIN THROMBOSIS): ICD-10-CM

## 2023-10-12 NOTE — TELEPHONE ENCOUNTER
Caller: Radha Cao    Relationship to patient: Self    Best call back number: [phone]    Chief complaint: NEED TO F/U DID NOT KNOW ABOUT THE 8/2/2023 APPTS    Type of visit: LAB & F/U 1     Requested date: MON AROUND 1045, CALL TO R/S     If rescheduling, when is the original appointment: 8/2/2023    Additional notes:

## 2023-10-13 NOTE — TELEPHONE ENCOUNTER
CALLED PATIENT WITH APPT DATE AND TIME ASKED THAT IF THIS WAS NOT A GOOD DATE AND TIME TO CALL US BACK

## 2023-10-16 ENCOUNTER — TELEPHONE (OUTPATIENT)
Dept: GASTROENTEROLOGY | Facility: CLINIC | Age: 53
End: 2023-10-16
Payer: COMMERCIAL

## 2023-10-16 NOTE — TELEPHONE ENCOUNTER
Hub staff attempted to follow warm transfer process and was unsuccessful     Caller: KODY    Relationship to patient: Provider    Best call back number: [phone]    Patient is needing: [facility] CALLING BECAUSE THEY HAVE NOT YET RECEIVED PRIOR AUTH FOR PROCEDURE FOR PATIENT. THIS NEEDS TO BE OBTAINED BY 2PM OR PROCEDURE WILL NEED TO BE RESCHEDULED. PLEASE REVIEW AND CONTACT KODY WITH [facility] TO ADVISE.

## 2023-10-19 ENCOUNTER — TELEPHONE (OUTPATIENT)
Dept: GASTROENTEROLOGY | Facility: CLINIC | Age: 53
End: 2023-10-19
Payer: COMMERCIAL

## 2023-10-19 NOTE — TELEPHONE ENCOUNTER
CALLER: Radha Cao     RELATIONSHIP TO PATIENT: Self    BEST CALL BACK NUMBER: [phone]    CALL REGARDING: Prior Authorization of CT and informed that the reason for Schedule Cancellation Multiple Times Per [facility] was Order by Dr. Pina and Steph Carrizales is that Prior Authorization is Not Getting Done or Precertified in time from Gastroenterology Department.     Ms. Cao informed that she has Passport Insurance    Ms. Cao is Frustrated because it takes 3 Weeks Out to Schedule and it has been 9 Weeks Already and has to schedule/cancel transportation      Ms. Cao also informed that   Naldemedine Tosylate (Symproic) 0.2 MG tablet Prescription is not working like it used to at the beginning. Within the last month it has caused stomach to hurt and go longer times with out moving bowels.     Would like to know if there is anything else that she can take.    Pharmacy    [facility] - [place], [place] - [facility] [address] - [phone]  - [phone] [facility] [address], [facility] [address]  Phone: [phone]  Fax: [phone]     Patient Request a Call Back

## 2023-10-23 RX ORDER — APIXABAN 5 MG/1
5 TABLET, FILM COATED ORAL EVERY 12 HOURS
Qty: 60 TABLET | Refills: 5 | Status: SHIPPED | OUTPATIENT
Start: 2023-10-23

## 2023-10-23 NOTE — TELEPHONE ENCOUNTER
APPROVED MRI AT [facility] [id number] 10- TO 01/17/2024 SPOKE WITH DARREL LIN AT [employer] [phone]

## 2023-10-23 NOTE — TELEPHONE ENCOUNTER
APPROVED MRI AT [facility] [id number] 10- TO 01/17/2024 SPOKE WITH DARREL LIN AT [employer] [phone] LM ON PT VM WITH THIS INFORMATION

## 2023-10-24 DIAGNOSIS — I82.411 ACUTE DEEP VEIN THROMBOSIS (DVT) OF FEMORAL VEIN OF RIGHT LOWER EXTREMITY: ICD-10-CM

## 2023-10-24 DIAGNOSIS — Z79.01 ANTICOAGULATION ADEQUATE: Primary | ICD-10-CM

## 2023-10-24 DIAGNOSIS — Z86.718 HISTORY OF DVT (DEEP VEIN THROMBOSIS): ICD-10-CM

## 2023-11-09 ENCOUNTER — TELEPHONE (OUTPATIENT)
Dept: ONCOLOGY | Facility: CLINIC | Age: 53
End: 2023-11-09
Payer: COMMERCIAL

## 2023-11-09 NOTE — TELEPHONE ENCOUNTER
Called pt regarding appt she missed this morning on 11/9/23 - stated her ride fell through and she meant to call earlier to cx but fell asleep - Depends on her  to bring her - Will have Lorna call pt to r/s

## 2023-11-10 ENCOUNTER — TELEPHONE (OUTPATIENT)
Dept: ONCOLOGY | Facility: CLINIC | Age: 53
End: 2023-11-10
Payer: COMMERCIAL

## 2023-11-10 NOTE — TELEPHONE ENCOUNTER
----- Message from Krystyna Kelley sent at 11/9/2023 12:17 PM EST -----  DELTA,    I CALLED PT ABOUT HER MISSED APPT TODAY - SHE STATED HER RIDE FELL THROUGH AND SHE MEANT TO CALL EARLIER AND CX BUT FELL ASLEEP -     PLEASE CALL TO R/S - NEEDS TO DISCUSS SPECIFIC TIME FRAME - EITHER LOCATION

## 2023-12-07 ENCOUNTER — TELEPHONE (OUTPATIENT)
Dept: ONCOLOGY | Facility: CLINIC | Age: 53
End: 2023-12-07
Payer: COMMERCIAL

## 2023-12-07 NOTE — TELEPHONE ENCOUNTER
Attempted to call no answer Patient's voice mail box full could not leave a message.    Patient is scheduled to follow up with Dr Lala 12/13/23 @ 9:30 am. Unfortunately Dr Lala has no available appointments sooner than 12/13/23.

## 2023-12-07 NOTE — TELEPHONE ENCOUNTER
Called Dr Harris Rebolledo's office [facility] due to a phone message received from patient stating:  PT CALLED TO SPEAK TO KATIE REGARDING HER APPOINTMENT, PATIENT JUST LEFT HER ORTHO DR AND THEY ARE WANTING TO DO SURGERY ASAP BUT WANT PATIENT TO CHECK ON HER BLOOD CLOT FIRST.   PATIENT WANTED TO SPEAK TO KATIE       Explained to Liya attempted to call patient no answer and could not leave a message to return call due to voice mail is full. Called to inquiry how soon patient was requiring surgery and patient has appointment with Dr Lala on 12/13/23 and that is the first available appointment. Also explained to Liya patient takes Eliquis 5 mg daily and type of surgery patient is needing will determine if patient can just HOLD Eliquis for surgery or if she will need Lovenox bridging.  Liya stated she will send questions to Dr Rebolledo's RN and have them return a call.

## 2023-12-07 NOTE — TELEPHONE ENCOUNTER
Caller: Radha Cao    Relationship: Self    Best call back number: [phone]    What was the call regarding: PT CALLED TO SPEAK TO KATIE REGARDING HER APPOINTMENT, PATIENT JUST LEFT HER ORTHO DR AND THEY ARE WANTING TO DO SURGERY ASAP BUT WANT PATIENT TO CHECK ON HER BLOOD CLOT FIRST.   PATIENT WANTED TO SPEAK TO KATIE

## 2023-12-11 ENCOUNTER — TELEPHONE (OUTPATIENT)
Dept: ONCOLOGY | Facility: CLINIC | Age: 53
End: 2023-12-11
Payer: COMMERCIAL

## 2023-12-11 DIAGNOSIS — Z86.718 HISTORY OF DVT (DEEP VEIN THROMBOSIS): Primary | ICD-10-CM

## 2023-12-11 NOTE — TELEPHONE ENCOUNTER
CALLED PATIENT WITH APPT DATE AND TIME - SHE WILL BE GETTING HER DOPPLE SAME DAY AS DR ANGEL PATEL

## 2023-12-11 NOTE — TELEPHONE ENCOUNTER
Called the patient and let her know doppler was ordered by Dr. Lala and she requested to have this done on the day of her 12/13 appt and she requested after seeing Dr. Lala. I let her know I would pass this along to scheduling and she v/u.

## 2023-12-11 NOTE — TELEPHONE ENCOUNTER
Called the patent back and she states that she needs an US doppler ordered per her ortho doctor prior to them doing surgery. She states she also needs this done on the 12/13 date as she cannot make multiple trips here. I let her know Dr. Lala and Iveth were out of office but that I would see about getting the order for this and scheduled. She v/u.

## 2023-12-11 NOTE — TELEPHONE ENCOUNTER
Provider: Spike  Caller: patient  Relationship to Patient: self  Call Back Phone Number: [phone]  Reason for Call:Pt asking to get an order to get US Doppler on her legs.

## 2023-12-13 ENCOUNTER — HOSPITAL ENCOUNTER (OUTPATIENT)
Dept: CARDIOLOGY | Facility: HOSPITAL | Age: 53
Discharge: HOME | End: 2023-12-13
Admitting: INTERNAL MEDICINE
Payer: COMMERCIAL

## 2023-12-13 ENCOUNTER — LAB (OUTPATIENT)
Dept: LAB | Facility: HOSPITAL | Age: 53
End: 2023-12-13
Payer: COMMERCIAL

## 2023-12-13 ENCOUNTER — OFFICE VISIT (OUTPATIENT)
Dept: ONCOLOGY | Facility: CLINIC | Age: 53
End: 2023-12-13
Payer: COMMERCIAL

## 2023-12-13 VITALS
HEIGHT: 64 IN | BODY MASS INDEX: 29.19 KG/M2 | TEMPERATURE: 97.8 F | WEIGHT: 171 LBS | DIASTOLIC BLOOD PRESSURE: 83 MMHG | SYSTOLIC BLOOD PRESSURE: 129 MMHG | HEART RATE: 79 BPM | OXYGEN SATURATION: 100 %

## 2023-12-13 DIAGNOSIS — Z86.718 HISTORY OF DVT (DEEP VEIN THROMBOSIS): ICD-10-CM

## 2023-12-13 DIAGNOSIS — I82.511 CHRONIC DEEP VEIN THROMBOSIS (DVT) OF FEMORAL VEIN OF RIGHT LOWER EXTREMITY: Primary | ICD-10-CM

## 2023-12-13 DIAGNOSIS — I82.411 ACUTE DEEP VEIN THROMBOSIS (DVT) OF FEMORAL VEIN OF RIGHT LOWER EXTREMITY: ICD-10-CM

## 2023-12-13 DIAGNOSIS — Z79.01 ANTICOAGULATION ADEQUATE: ICD-10-CM

## 2023-12-13 DIAGNOSIS — Z01.818 PREOPERATIVE EVALUATION TO RULE OUT SURGICAL CONTRAINDICATION: ICD-10-CM

## 2023-12-13 LAB
BASOPHILS # BLD AUTO: 0.05 10*3/MM3 (ref 0–0.2)
BASOPHILS NFR BLD AUTO: 0.4 % (ref 0–1.5)
BH CV LOW VAS RIGHT DISTAL FEMORAL SPONT: 1
BH CV LOW VAS RIGHT POPLITEAL SPONT: 1
BH CV LOWER VASCULAR LEFT COMMON FEMORAL AUGMENT: NORMAL
BH CV LOWER VASCULAR LEFT COMMON FEMORAL COMPETENT: NORMAL
BH CV LOWER VASCULAR LEFT COMMON FEMORAL COMPRESS: NORMAL
BH CV LOWER VASCULAR LEFT COMMON FEMORAL PHASIC: NORMAL
BH CV LOWER VASCULAR LEFT COMMON FEMORAL SPONT: NORMAL
BH CV LOWER VASCULAR RIGHT COMMON FEMORAL AUGMENT: NORMAL
BH CV LOWER VASCULAR RIGHT COMMON FEMORAL COMPETENT: NORMAL
BH CV LOWER VASCULAR RIGHT COMMON FEMORAL COMPRESS: NORMAL
BH CV LOWER VASCULAR RIGHT COMMON FEMORAL PHASIC: NORMAL
BH CV LOWER VASCULAR RIGHT COMMON FEMORAL SPONT: NORMAL
BH CV LOWER VASCULAR RIGHT DISTAL FEMORAL COMPRESS: NORMAL
BH CV LOWER VASCULAR RIGHT DISTAL FEMORAL THROMBUS: NORMAL
BH CV LOWER VASCULAR RIGHT GASTRONEMIUS COMPRESS: NORMAL
BH CV LOWER VASCULAR RIGHT GREATER SAPH AK COMPRESS: NORMAL
BH CV LOWER VASCULAR RIGHT GREATER SAPH BK COMPRESS: NORMAL
BH CV LOWER VASCULAR RIGHT LESSER SAPH COMPRESS: NORMAL
BH CV LOWER VASCULAR RIGHT MID FEMORAL AUGMENT: NORMAL
BH CV LOWER VASCULAR RIGHT MID FEMORAL COMPETENT: NORMAL
BH CV LOWER VASCULAR RIGHT MID FEMORAL COMPRESS: NORMAL
BH CV LOWER VASCULAR RIGHT MID FEMORAL PHASIC: NORMAL
BH CV LOWER VASCULAR RIGHT MID FEMORAL SPONT: NORMAL
BH CV LOWER VASCULAR RIGHT PERONEAL COMPRESS: NORMAL
BH CV LOWER VASCULAR RIGHT POPLITEAL AUGMENT: NORMAL
BH CV LOWER VASCULAR RIGHT POPLITEAL COMPRESS: NORMAL
BH CV LOWER VASCULAR RIGHT POPLITEAL PHASIC: NORMAL
BH CV LOWER VASCULAR RIGHT POPLITEAL SPONT: NORMAL
BH CV LOWER VASCULAR RIGHT POPLITEAL THROMBUS: NORMAL
BH CV LOWER VASCULAR RIGHT POSTERIOR TIBIAL COMPRESS: NORMAL
BH CV LOWER VASCULAR RIGHT PROFUNDA FEMORAL COMPRESS: NORMAL
BH CV LOWER VASCULAR RIGHT PROXIMAL FEMORAL COMPRESS: NORMAL
BH CV LOWER VASCULAR RIGHT SAPHENOFEMORAL JUNCTION COMPRESS: NORMAL
DEPRECATED RDW RBC AUTO: 45.7 FL (ref 37–54)
EOSINOPHIL # BLD AUTO: 0.17 10*3/MM3 (ref 0–0.4)
EOSINOPHIL NFR BLD AUTO: 1.5 % (ref 0.3–6.2)
ERYTHROCYTE [DISTWIDTH] IN BLOOD BY AUTOMATED COUNT: 13.5 % (ref 12.3–15.4)
HCT VFR BLD AUTO: 39.3 % (ref 34–46.6)
HGB BLD-MCNC: 13.1 G/DL (ref 12–15.9)
IMM GRANULOCYTES # BLD AUTO: 0.04 10*3/MM3 (ref 0–0.05)
IMM GRANULOCYTES NFR BLD AUTO: 0.3 % (ref 0–0.5)
LYMPHOCYTES # BLD AUTO: 4.22 10*3/MM3 (ref 0.7–3.1)
LYMPHOCYTES NFR BLD AUTO: 36.3 % (ref 19.6–45.3)
MCH RBC QN AUTO: 31 PG (ref 26.6–33)
MCHC RBC AUTO-ENTMCNC: 33.3 G/DL (ref 31.5–35.7)
MCV RBC AUTO: 92.9 FL (ref 79–97)
MONOCYTES # BLD AUTO: 1.06 10*3/MM3 (ref 0.1–0.9)
MONOCYTES NFR BLD AUTO: 9.1 % (ref 5–12)
NEUTROPHILS NFR BLD AUTO: 52.4 % (ref 42.7–76)
NEUTROPHILS NFR BLD AUTO: 6.07 10*3/MM3 (ref 1.7–7)
NRBC BLD AUTO-RTO: 0 /100 WBC (ref 0–0.2)
PLATELET # BLD AUTO: 323 10*3/MM3 (ref 140–450)
PMV BLD AUTO: 9.5 FL (ref 6–12)
RBC # BLD AUTO: 4.23 10*6/MM3 (ref 3.77–5.28)
WBC NRBC COR # BLD AUTO: 11.61 10*3/MM3 (ref 3.4–10.8)

## 2023-12-13 PROCEDURE — 85025 COMPLETE CBC W/AUTO DIFF WBC: CPT

## 2023-12-13 PROCEDURE — 36415 COLL VENOUS BLD VENIPUNCTURE: CPT

## 2023-12-13 PROCEDURE — 93971 EXTREMITY STUDY: CPT

## 2023-12-13 RX ORDER — ENOXAPARIN SODIUM 100 MG/ML
80 INJECTION SUBCUTANEOUS EVERY 12 HOURS SCHEDULED
Qty: 8 ML | Refills: 1 | Status: SHIPPED | OUTPATIENT
Start: 2023-12-13
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
## 2024-02-07 RX ORDER — NALDEMEDINE 0.2 MG/1
1 TABLET ORAL DAILY
Qty: 90 TABLET | Refills: 3 | Status: SHIPPED | OUTPATIENT
Start: 2024-02-07

## 2024-02-13 ENCOUNTER — OFFICE VISIT (OUTPATIENT)
Dept: FAMILY MEDICINE CLINIC | Facility: CLINIC | Age: 54
End: 2024-02-13
Payer: COMMERCIAL

## 2024-02-13 VITALS
SYSTOLIC BLOOD PRESSURE: 136 MMHG | HEIGHT: 64 IN | WEIGHT: 197.1 LBS | BODY MASS INDEX: 33.65 KG/M2 | HEART RATE: 98 BPM | OXYGEN SATURATION: 98 % | DIASTOLIC BLOOD PRESSURE: 82 MMHG

## 2024-02-13 DIAGNOSIS — B37.2 YEAST INFECTION OF THE SKIN: Primary | ICD-10-CM

## 2024-02-13 DIAGNOSIS — B89 PARASITE INFECTION: ICD-10-CM

## 2024-02-13 RX ORDER — DIPHENOXYLATE HYDROCHLORIDE AND ATROPINE SULFATE 2.5; .025 MG/1; MG/1
1 TABLET ORAL DAILY
COMMUNITY

## 2024-02-13 RX ORDER — AMANTADINE HYDROCHLORIDE 100 MG/1
TABLET ORAL
COMMUNITY
Start: 2024-02-08

## 2024-02-13 RX ORDER — FLUCONAZOLE 100 MG/1
TABLET ORAL
Qty: 12 TABLET | Refills: 0 | Status: SHIPPED | OUTPATIENT
Start: 2024-02-13

## 2024-02-13 RX ORDER — KETOCONAZOLE 20 MG/G
1 CREAM TOPICAL DAILY
Qty: 15 G | Refills: 3 | Status: SHIPPED | OUTPATIENT
Start: 2024-02-13

## 2024-02-13 RX ORDER — GLUCOSAMINE/D3/BOSWELLIA SERRA 1500MG-400
1 TABLET ORAL DAILY
COMMUNITY

## 2024-02-13 RX ORDER — NYSTATIN 100000 U/G
1 CREAM TOPICAL 2 TIMES DAILY
Qty: 15 G | Refills: 3 | Status: SHIPPED | OUTPATIENT
Start: 2024-02-13

## 2024-02-13 RX ORDER — FLUTICASONE PROPIONATE 50 MCG
SPRAY, SUSPENSION (ML) NASAL
COMMUNITY
Start: 2024-01-29

## 2024-02-13 RX ORDER — MULTIVIT WITH MINERALS/LUTEIN
1000 TABLET ORAL
COMMUNITY

## 2024-02-13 RX ORDER — FLUCONAZOLE 100 MG/1
100 TABLET ORAL DAILY
Qty: 3 TABLET | Refills: 0 | Status: SHIPPED | OUTPATIENT
Start: 2024-02-13 | End: 2024-02-13 | Stop reason: SDUPTHER

## 2024-02-15 ENCOUNTER — TELEPHONE (OUTPATIENT)
Dept: ONCOLOGY | Facility: CLINIC | Age: 54
End: 2024-02-15
Payer: COMMERCIAL

## 2024-02-15 DIAGNOSIS — I82.511 CHRONIC DEEP VEIN THROMBOSIS (DVT) OF FEMORAL VEIN OF RIGHT LOWER EXTREMITY: Primary | ICD-10-CM

## 2024-02-16 LAB
O+P SPEC MICRO: NORMAL
O+P STL CONC: NORMAL

## 2024-02-22 ENCOUNTER — HOSPITAL ENCOUNTER (OUTPATIENT)
Dept: CARDIOLOGY | Facility: HOSPITAL | Age: 54
Discharge: HOME | End: 2024-02-22
Admitting: INTERNAL MEDICINE
Payer: COMMERCIAL

## 2024-02-22 ENCOUNTER — TELEPHONE (OUTPATIENT)
Dept: ONCOLOGY | Facility: CLINIC | Age: 54
End: 2024-02-22
Payer: COMMERCIAL

## 2024-02-22 DIAGNOSIS — I82.511 CHRONIC DEEP VEIN THROMBOSIS (DVT) OF FEMORAL VEIN OF RIGHT LOWER EXTREMITY: ICD-10-CM

## 2024-02-22 LAB
BH CV LOW VAS RIGHT DISTAL FEMORAL SPONT: 1
BH CV LOW VAS RIGHT POPLITEAL SPONT: 1
BH CV LOWER VASCULAR LEFT COMMON FEMORAL AUGMENT: NORMAL
BH CV LOWER VASCULAR LEFT COMMON FEMORAL COMPETENT: NORMAL
BH CV LOWER VASCULAR LEFT COMMON FEMORAL COMPRESS: NORMAL
BH CV LOWER VASCULAR LEFT COMMON FEMORAL PHASIC: NORMAL
BH CV LOWER VASCULAR LEFT COMMON FEMORAL SPONT: NORMAL
BH CV LOWER VASCULAR RIGHT COMMON FEMORAL AUGMENT: NORMAL
BH CV LOWER VASCULAR RIGHT COMMON FEMORAL COMPETENT: NORMAL
BH CV LOWER VASCULAR RIGHT COMMON FEMORAL COMPRESS: NORMAL
BH CV LOWER VASCULAR RIGHT COMMON FEMORAL PHASIC: NORMAL
BH CV LOWER VASCULAR RIGHT COMMON FEMORAL SPONT: NORMAL
BH CV LOWER VASCULAR RIGHT DISTAL FEMORAL AUGMENT: NORMAL
BH CV LOWER VASCULAR RIGHT DISTAL FEMORAL COMPETENT: NORMAL
BH CV LOWER VASCULAR RIGHT DISTAL FEMORAL COMPRESS: NORMAL
BH CV LOWER VASCULAR RIGHT DISTAL FEMORAL PHASIC: NORMAL
BH CV LOWER VASCULAR RIGHT DISTAL FEMORAL SPONT: NORMAL
BH CV LOWER VASCULAR RIGHT DISTAL FEMORAL THROMBUS: NORMAL
BH CV LOWER VASCULAR RIGHT GASTRONEMIUS COMPRESS: NORMAL
BH CV LOWER VASCULAR RIGHT GREATER SAPH AK COMPRESS: NORMAL
BH CV LOWER VASCULAR RIGHT GREATER SAPH BK COMPRESS: NORMAL
BH CV LOWER VASCULAR RIGHT LESSER SAPH COMPRESS: NORMAL
BH CV LOWER VASCULAR RIGHT MID FEMORAL AUGMENT: NORMAL
BH CV LOWER VASCULAR RIGHT MID FEMORAL COMPETENT: NORMAL
BH CV LOWER VASCULAR RIGHT MID FEMORAL COMPRESS: NORMAL
BH CV LOWER VASCULAR RIGHT MID FEMORAL PHASIC: NORMAL
BH CV LOWER VASCULAR RIGHT MID FEMORAL SPONT: NORMAL
BH CV LOWER VASCULAR RIGHT PERONEAL COMPRESS: NORMAL
BH CV LOWER VASCULAR RIGHT POPLITEAL AUGMENT: NORMAL
BH CV LOWER VASCULAR RIGHT POPLITEAL COMPETENT: NORMAL
BH CV LOWER VASCULAR RIGHT POPLITEAL COMPRESS: NORMAL
BH CV LOWER VASCULAR RIGHT POPLITEAL PHASIC: NORMAL
BH CV LOWER VASCULAR RIGHT POPLITEAL SPONT: NORMAL
BH CV LOWER VASCULAR RIGHT POPLITEAL THROMBUS: NORMAL
BH CV LOWER VASCULAR RIGHT POSTERIOR TIBIAL COMPRESS: NORMAL
BH CV LOWER VASCULAR RIGHT PROFUNDA FEMORAL COMPRESS: NORMAL
BH CV LOWER VASCULAR RIGHT PROXIMAL FEMORAL COMPRESS: NORMAL
BH CV LOWER VASCULAR RIGHT SAPHENOFEMORAL JUNCTION COMPRESS: NORMAL

## 2024-02-22 PROCEDURE — 93971 EXTREMITY STUDY: CPT
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
## 2024-03-08 ENCOUNTER — TELEPHONE (OUTPATIENT)
Dept: GASTROENTEROLOGY | Facility: CLINIC | Age: 54
End: 2024-03-08
Payer: COMMERCIAL

## 2024-03-08 NOTE — TELEPHONE ENCOUNTER
Provider: DR IRENE ANDREWS    Caller: KELSY    Relationship to Patient: [facility]    Phone Number: [phone]    Reason for Call: PT IS GETTING MRI OF ABDOMIN AND SHE NEEDS A PRESERT [id number] AND THAT IS THE CPT CODE FOR THE MRI AND IT NEEDS TO BE DONE 3/13 TH BY 2 PM OR PT CAN'T PERFORM TEST

## 2024-03-12 NOTE — TELEPHONE ENCOUNTER
Hub staff attempted to follow warm transfer process and was unsuccessful     Caller: KELSY    Relationship to patient: Other    Best call back number: [phone]    Patient is needing: STILL HAS NOT RECEIVED THE PREAUTHORIZATION. THEY STATED IT IS DUE BY TOMORROW, 03/13/2024, OR THE PT MRI WILL NEED TO BE RESCHEDULED.

## 2024-03-18 ENCOUNTER — TELEPHONE (OUTPATIENT)
Dept: GASTROENTEROLOGY | Facility: CLINIC | Age: 54
End: 2024-03-18
Payer: COMMERCIAL

## 2024-03-18 RX ORDER — FLUTICASONE PROPIONATE 50 MCG
1 SPRAY, SUSPENSION (ML) NASAL 2 TIMES DAILY
Qty: 9.9 ML | Refills: 5 | Status: SHIPPED | OUTPATIENT
Start: 2024-03-18

## 2024-03-18 NOTE — TELEPHONE ENCOUNTER
Pt reviewed results via [facility].     Sent pt [facility] msg advising of results and recommendations. Advised to call if any questions.

## 2024-03-18 NOTE — TELEPHONE ENCOUNTER
----- Message from Jason Pina MD sent at 3/17/2024  3:24 PM EDT -----  Benign hepatic cysts  Let the patient know  No further workup required

## 2024-03-25 ENCOUNTER — TELEPHONE (OUTPATIENT)
Dept: FAMILY MEDICINE CLINIC | Facility: CLINIC | Age: 54
End: 2024-03-25
Payer: COMMERCIAL

## 2024-03-25 ENCOUNTER — TELEPHONE (OUTPATIENT)
Dept: GASTROENTEROLOGY | Facility: CLINIC | Age: 54
End: 2024-03-25
Payer: COMMERCIAL
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
## 2024-03-25 NOTE — TELEPHONE ENCOUNTER
Per patient via my chart:       No ma'am no questions on the test results I got that perfectly and that is a okay with me My problem is that I have the IBS and Gricelda and Dr Felton have me on cyproic and I am constipated badly and I need some relief quickly is there anything you can call me in at my pharmacy I'm having surgery on Wednesday orthopedic surgery.    Patient called. She states Symproic 0.2 mg  one tablet daily.   She states the last BM was 4 days ago and she does not feel she has emptied her bowels.     She states she has tried and failed Linzess 72 mg.     Patient states she is scheduled for orthopedic surgery for Wednesday and would like to have a BM before then.   Update to Steph.

## 2024-03-25 NOTE — TELEPHONE ENCOUNTER
----- Message from Radha Cao sent at 3/25/2024  1:15 PM EDT -----  Regarding: MRI results  Contact: [phone] is my #

## 2024-04-16 ENCOUNTER — TELEPHONE (OUTPATIENT)
Dept: ONCOLOGY | Facility: CLINIC | Age: 54
End: 2024-04-16
Payer: COMMERCIAL

## 2024-04-16 NOTE — TELEPHONE ENCOUNTER
es, it is the appointment that is set for me on Thursday at 2:20 or Wednesday. I am sorry that cannot happen. Somebody needs to call me. It is in my notes. It has to be before 1 o'clock. It is the only ride that I have.  04/15/2024 06:14 PM  This was left on clarus

## 2024-04-18 ENCOUNTER — LAB (OUTPATIENT)
Dept: OTHER | Facility: HOSPITAL | Age: 54
End: 2024-04-18
Payer: COMMERCIAL

## 2024-04-18 ENCOUNTER — OFFICE VISIT (OUTPATIENT)
Dept: ONCOLOGY | Facility: CLINIC | Age: 54
End: 2024-04-18
Payer: COMMERCIAL

## 2024-04-18 VITALS
HEART RATE: 89 BPM | TEMPERATURE: 98.4 F | RESPIRATION RATE: 16 BRPM | HEIGHT: 64 IN | WEIGHT: 171 LBS | DIASTOLIC BLOOD PRESSURE: 66 MMHG | OXYGEN SATURATION: 98 % | BODY MASS INDEX: 29.19 KG/M2 | SYSTOLIC BLOOD PRESSURE: 116 MMHG

## 2024-04-18 DIAGNOSIS — I82.511 CHRONIC DEEP VEIN THROMBOSIS (DVT) OF FEMORAL VEIN OF RIGHT LOWER EXTREMITY: Primary | ICD-10-CM

## 2024-04-18 DIAGNOSIS — Z79.01 ANTICOAGULATION ADEQUATE: ICD-10-CM

## 2024-04-18 DIAGNOSIS — I82.511 CHRONIC DEEP VEIN THROMBOSIS (DVT) OF FEMORAL VEIN OF RIGHT LOWER EXTREMITY: ICD-10-CM

## 2024-04-18 LAB
ALBUMIN SERPL-MCNC: 4.3 G/DL (ref 3.5–5.2)
ALBUMIN/GLOB SERPL: 1.3 G/DL
ALP SERPL-CCNC: 73 U/L (ref 39–117)
ALT SERPL W P-5'-P-CCNC: 8 U/L (ref 1–33)
ANION GAP SERPL CALCULATED.3IONS-SCNC: 7.3 MMOL/L (ref 5–15)
AST SERPL-CCNC: 17 U/L (ref 1–32)
BASOPHILS # BLD AUTO: 0.04 10*3/MM3 (ref 0–0.2)
BASOPHILS NFR BLD AUTO: 0.8 % (ref 0–1.5)
BILIRUB SERPL-MCNC: 0.3 MG/DL (ref 0–1.2)
BUN SERPL-MCNC: 13 MG/DL (ref 6–20)
BUN/CREAT SERPL: 17.1 (ref 7–25)
CALCIUM SPEC-SCNC: 9.8 MG/DL (ref 8.6–10.5)
CHLORIDE SERPL-SCNC: 103 MMOL/L (ref 98–107)
CO2 SERPL-SCNC: 29.7 MMOL/L (ref 22–29)
CREAT SERPL-MCNC: 0.76 MG/DL (ref 0.57–1)
DEPRECATED RDW RBC AUTO: 44.9 FL (ref 37–54)
EGFRCR SERPLBLD CKD-EPI 2021: 93.3 ML/MIN/1.73
EOSINOPHIL # BLD AUTO: 0.07 10*3/MM3 (ref 0–0.4)
EOSINOPHIL NFR BLD AUTO: 1.4 % (ref 0.3–6.2)
ERYTHROCYTE [DISTWIDTH] IN BLOOD BY AUTOMATED COUNT: 13.2 % (ref 12.3–15.4)
GLOBULIN UR ELPH-MCNC: 3.2 GM/DL
GLUCOSE SERPL-MCNC: 93 MG/DL (ref 65–99)
HCT VFR BLD AUTO: 39.8 % (ref 34–46.6)
HGB BLD-MCNC: 13 G/DL (ref 12–15.9)
IMM GRANULOCYTES # BLD AUTO: 0.01 10*3/MM3 (ref 0–0.05)
IMM GRANULOCYTES NFR BLD AUTO: 0.2 % (ref 0–0.5)
LYMPHOCYTES # BLD AUTO: 1.59 10*3/MM3 (ref 0.7–3.1)
LYMPHOCYTES NFR BLD AUTO: 32.3 % (ref 19.6–45.3)
MCH RBC QN AUTO: 30.2 PG (ref 26.6–33)
MCHC RBC AUTO-ENTMCNC: 32.7 G/DL (ref 31.5–35.7)
MCV RBC AUTO: 92.6 FL (ref 79–97)
MONOCYTES # BLD AUTO: 0.77 10*3/MM3 (ref 0.1–0.9)
MONOCYTES NFR BLD AUTO: 15.6 % (ref 5–12)
NEUTROPHILS NFR BLD AUTO: 2.45 10*3/MM3 (ref 1.7–7)
NEUTROPHILS NFR BLD AUTO: 49.7 % (ref 42.7–76)
NRBC BLD AUTO-RTO: 0 /100 WBC (ref 0–0.2)
PLATELET # BLD AUTO: 290 10*3/MM3 (ref 140–450)
PMV BLD AUTO: 9.1 FL (ref 6–12)
POTASSIUM SERPL-SCNC: 4.3 MMOL/L (ref 3.5–5.2)
PROT SERPL-MCNC: 7.5 G/DL (ref 6–8.5)
RBC # BLD AUTO: 4.3 10*6/MM3 (ref 3.77–5.28)
SODIUM SERPL-SCNC: 140 MMOL/L (ref 136–145)
WBC NRBC COR # BLD AUTO: 4.93 10*3/MM3 (ref 3.4–10.8)

## 2024-04-18 PROCEDURE — 36415 COLL VENOUS BLD VENIPUNCTURE: CPT

## 2024-04-18 PROCEDURE — 80053 COMPREHEN METABOLIC PANEL: CPT | Performed by: INTERNAL MEDICINE

## 2024-04-18 PROCEDURE — 85025 COMPLETE CBC W/AUTO DIFF WBC: CPT | Performed by: INTERNAL MEDICINE
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
## 2024-04-26 ENCOUNTER — READMISSION MANAGEMENT (OUTPATIENT)
Dept: CALL CENTER | Facility: HOSPITAL | Age: 54
End: 2024-04-26
Payer: COMMERCIAL

## 2024-04-26 NOTE — OUTREACH NOTE
Prep Survey      Flowsheet Row Responses   [facility] facility patient discharged from? Non-[facility]   Is LACE score < 7 ? Non-BH Discharge   Eligibility [facility]   Date of Admission 04/24/24   Date of Discharge 04/26/24   Discharge Disposition Home-Health Care [facility]   Discharge diagnosis Infection of bone of right ankle (Primary Dx),    Does the patient have one of the following disease processes/diagnoses(primary or secondary)? Other   Does the patient have Home health ordered? Yes   Is there a DME ordered? No   Prep survey completed? Yes            KYLEE PATEL - Registered Nurse

## 2024-04-29 ENCOUNTER — TRANSITIONAL CARE MANAGEMENT TELEPHONE ENCOUNTER (OUTPATIENT)
Dept: CALL CENTER | Facility: HOSPITAL | Age: 54
End: 2024-04-29
Payer: COMMERCIAL

## 2024-04-29 NOTE — OUTREACH NOTE
Call Center TCM Note      Flowsheet Row Responses   [facility] patient discharged from? Non-  [[place]]   Does the patient have one of the following disease processes/diagnoses(primary or secondary)? Other   TCM attempt successful? Yes   Call start time 1045   Call end time 1048   Discharge diagnosis Infection of bone of right ankle (Primary Dx),    Medication alerts for this patient Home IV Antibiotics via PICC line   Meds reviewed with patient/caregiver? Yes   Is the patient having any side effects they believe may be caused by any medication additions or changes? No   Does the patient have all medications ordered at discharge? Yes   Is the patient taking all medications as directed (includes completed medication regime)? Yes   Does the patient have an appointment with their PCP within 7-14 days of discharge? No   Nursing Interventions Patient declined scheduling/rescheduling appointment at this time, Patient desires to follow up with specialty only   What is the Home health agency?  Home Health   Has home health visited the patient within 72 hours of discharge? Yes   Psychosocial issues? No   Did the patient receive a copy of their discharge instructions? Yes   What is the patient's perception of their health status since discharge? Improving   Is the patient/caregiver able to teach back signs and symptoms related to disease process for when to call PCP? Yes   Is the patient/caregiver able to teach back signs and symptoms related to disease process for when to call 911? Yes   Is the patient/caregiver able to teach back the hierarchy of who to call/visit for symptoms/problems? PCP, Specialist, Home health nurse, Urgent Care, ED, 911 Yes   TCM call completed? Yes   Wrap up additional comments Doing well, no questions, declined to schedule a hospital f/u appt with PCP at this time, states she will seeing her specialist.   Call end time 1048   Would this patient benefit from a Referral to [facility] Social Work? No    Is the patient interested in additional calls from an ambulatory ? No            Thao Henao RN    4/29/2024, 10:48 EDT

## 2024-05-05 DIAGNOSIS — R11.0 NAUSEA: ICD-10-CM

## 2024-05-06 DIAGNOSIS — F33.1 MODERATE EPISODE OF RECURRENT MAJOR DEPRESSIVE DISORDER: Primary | ICD-10-CM

## 2024-05-06 RX ORDER — QUETIAPINE FUMARATE 100 MG/1
100 TABLET, FILM COATED ORAL NIGHTLY
Qty: 30 TABLET | Refills: 5 | Status: SHIPPED | OUTPATIENT
Start: 2024-05-06

## 2024-05-06 RX ORDER — QUETIAPINE FUMARATE 50 MG/1
50 TABLET, FILM COATED ORAL NIGHTLY
Qty: 30 TABLET | Refills: 5 | Status: SHIPPED | OUTPATIENT
Start: 2024-05-06

## 2024-05-06 RX ORDER — PROMETHAZINE HYDROCHLORIDE 25 MG/1
25 TABLET ORAL EVERY 6 HOURS PRN
Qty: 30 TABLET | Refills: 5 | Status: SHIPPED | OUTPATIENT
Start: 2024-05-06

## 2024-05-06 RX ORDER — VENLAFAXINE HYDROCHLORIDE 150 MG/1
225 CAPSULE, EXTENDED RELEASE ORAL DAILY
OUTPATIENT
Start: 2024-05-06

## 2024-05-06 RX ORDER — GLUCOSAMINE/D3/BOSWELLIA SERRA 1500MG-400
1 TABLET ORAL DAILY
Qty: 30 TABLET | Refills: 5 | Status: SHIPPED | OUTPATIENT
Start: 2024-05-06

## 2024-05-06 RX ORDER — MULTIVIT WITH MINERALS/LUTEIN
1000 TABLET ORAL DAILY
Qty: 30 TABLET | Refills: 5 | Status: SHIPPED | OUTPATIENT
Start: 2024-05-06

## 2024-05-06 RX ORDER — VENLAFAXINE HYDROCHLORIDE 225 MG/1
225 TABLET, EXTENDED RELEASE ORAL DAILY
Qty: 30 EACH | Refills: 5 | Status: SHIPPED | OUTPATIENT
Start: 2024-05-06

## 2024-05-07 RX ORDER — ONDANSETRON 8 MG/1
8 TABLET, ORALLY DISINTEGRATING ORAL EVERY 8 HOURS PRN
Qty: 30 TABLET | Refills: 0 | OUTPATIENT
Start: 2024-05-07

## 2024-05-15 RX ORDER — LORAZEPAM 1 MG/1
TABLET ORAL
OUTPATIENT
Start: 2024-05-15

## 2024-06-06 ENCOUNTER — TELEPHONE (OUTPATIENT)
Dept: ONCOLOGY | Facility: CLINIC | Age: 54
End: 2024-06-06
Payer: COMMERCIAL

## 2024-06-06 RX ORDER — APIXABAN 5 MG/1
5 TABLET, FILM COATED ORAL EVERY 12 HOURS
Qty: 60 TABLET | Refills: 5 | Status: SHIPPED | OUTPATIENT
Start: 2024-06-06

## 2024-06-06 NOTE — TELEPHONE ENCOUNTER
Patient calling stating she is unsure if she needs to follow up with Dr Lala today due to she was not able to have a complete ortho surgery due to infection. Patient restarted Eliquis after surgery.  Per Dr Lala patient can reschedule today's appointment for a few weeks.    Patient was inquiring if she needs a doppler. Patient is not experiencing any symptoms concerning a new DVT. Patient denies any symptoms.    Per Dr Lala patient does not need a doppler.    Message sent to appointment desk to reschedule

## 2024-06-06 NOTE — TELEPHONE ENCOUNTER
Caller: Sara Cao MARK    Relationship: Self    Best call back number: [phone]    What is the best time to reach you: ANY    Who are you requesting to speak with (clinical staff, provider,  specific staff member): CLINICAL     What was the call regarding: SARA IS CALLING STATES THAT SHE DID NOT HAVE THE WHOLE PROCEDURE DONE ON HER RIGHT FOOT. SHE STATES THAT THE ORTHO TOOK OUT THE HARDWARE AND THEY FOUND INFECTION    SARA IS WANTING TO KNOW IF SHE REALLY NEEDS TO BE SEEN THIS MORNING  SHE STATES SHE JUST HAD LABS ON 5-29 WHICH ARE IN [facility]      PLEASE ADVISE

## 2024-06-14 RX ORDER — SENNOSIDES 8.6 MG/1
TABLET, COATED ORAL
Qty: 20 TABLET | Refills: 0 | OUTPATIENT
Start: 2024-06-14

## 2024-06-14 RX ORDER — LINACLOTIDE 72 UG/1
72 CAPSULE, GELATIN COATED ORAL
Qty: 90 CAPSULE | Refills: 3 | OUTPATIENT
Start: 2024-06-14

## 2024-08-06 ENCOUNTER — TELEPHONE (OUTPATIENT)
Dept: FAMILY MEDICINE CLINIC | Facility: CLINIC | Age: 54
End: 2024-08-06
Payer: COMMERCIAL

## 2024-08-06 DIAGNOSIS — Z00.00 ANNUAL PHYSICAL EXAM: Primary | ICD-10-CM

## 2024-10-29 ENCOUNTER — TELEPHONE (OUTPATIENT)
Dept: ONCOLOGY | Facility: CLINIC | Age: 54
End: 2024-10-29
Payer: COMMERCIAL

## 2024-10-29 NOTE — TELEPHONE ENCOUNTER
"Pt has had a productive cough and noted some blood  in her sputum after some episodes of  hard coughing. Pt is worried she has a PE, however denies any SOA, wheezing or chest pain or pain on taking deep breaths. Pt also states she feels like she needs to have her \"blood clots checked\" in her leg. States she has not had any missed doses of her eliquis and while she does have some leg swelling, this is usual for her due to multiple leg surgeries and trauma. No increased swelling or pain in her leg beyond her baseline at this time. Pt has no follow up appointments scheduled in our office at this time.Pt also states she has difficulty getting to appointments and tests due to her poor mobility Reviewed with DR Lala's nurse as she is very familiar with this patient. With Dr Lala currently out of the country pt will need to reach out to her orthopedic surgeon if she has concerns about her leg or go to ER for evaluation. She may also see Dr Lala for f/u at the next available appointment. Attempted to reach pt to discuss options and she did not answer. Left voicemail to call our office back.  "

## 2024-11-07 DIAGNOSIS — F33.1 MODERATE EPISODE OF RECURRENT MAJOR DEPRESSIVE DISORDER: ICD-10-CM

## 2024-11-07 RX ORDER — VENLAFAXINE HYDROCHLORIDE 225 MG/1
225 TABLET, EXTENDED RELEASE ORAL DAILY
Qty: 30 EACH | Refills: 5 | Status: SHIPPED | OUTPATIENT
Start: 2024-11-07

## 2025-02-13 RX ORDER — NALDEMEDINE 0.2 MG/1
1 TABLET ORAL DAILY
Qty: 90 TABLET | Refills: 0 | Status: SHIPPED | OUTPATIENT
Start: 2025-02-13

## 2025-02-13 RX ORDER — APIXABAN 5 MG/1
5 TABLET, FILM COATED ORAL
Qty: 60 TABLET | Refills: 5 | Status: SHIPPED | OUTPATIENT
Start: 2025-02-13

## 2025-07-17 ENCOUNTER — TELEPHONE (OUTPATIENT)
Dept: FAMILY MEDICINE CLINIC | Facility: CLINIC | Age: 55
End: 2025-07-17

## 2025-07-18 ENCOUNTER — TELEPHONE (OUTPATIENT)
Dept: FAMILY MEDICINE CLINIC | Facility: CLINIC | Age: 55
End: 2025-07-18
Payer: COMMERCIAL

## 2025-07-18 NOTE — TELEPHONE ENCOUNTER
Patient is requesting a referral to Neuro and stated she may not need one with her type of insurance. Patient is calling her insurance to clarify if she needs one I told her if she does then to call the [facility] and schedule appointment for that referral.

## (undated) DEVICE — MSK ENDO PORT O2 POM ELITE CURAPLEX A/

## (undated) DEVICE — SENSR O2 OXIMAX FNGR A/ 18IN NONSTR

## (undated) DEVICE — ADAPT CLN BIOGUARD AIR/H2O DISP

## (undated) DEVICE — LN SMPL CO2 SHTRM SD STREAM W/M LUER

## (undated) DEVICE — BITEBLOCK OMNI BLOC

## (undated) DEVICE — TUBING, SUCTION, 1/4" X 10', STRAIGHT: Brand: MEDLINE

## (undated) DEVICE — KT ORCA ORCAPOD DISP STRL

## (undated) DEVICE — FRCP BX RADJAW4 NDL 2.8 240CM LG OG BX40